# Patient Record
Sex: FEMALE | Race: WHITE | HISPANIC OR LATINO | Employment: PART TIME | URBAN - METROPOLITAN AREA
[De-identification: names, ages, dates, MRNs, and addresses within clinical notes are randomized per-mention and may not be internally consistent; named-entity substitution may affect disease eponyms.]

---

## 2018-03-30 ENCOUNTER — HOSPITAL ENCOUNTER (EMERGENCY)
Facility: MEDICAL CENTER | Age: 23
End: 2018-03-31
Attending: EMERGENCY MEDICINE
Payer: MEDICAID

## 2018-03-30 DIAGNOSIS — L70.9 ACNE, UNSPECIFIED ACNE TYPE: ICD-10-CM

## 2018-03-30 PROCEDURE — 99283 EMERGENCY DEPT VISIT LOW MDM: CPT

## 2018-03-30 ASSESSMENT — PAIN SCALES - GENERAL: PAINLEVEL_OUTOF10: 0

## 2018-03-31 VITALS
HEART RATE: 81 BPM | RESPIRATION RATE: 18 BRPM | HEIGHT: 64 IN | OXYGEN SATURATION: 94 % | SYSTOLIC BLOOD PRESSURE: 115 MMHG | WEIGHT: 141.54 LBS | TEMPERATURE: 97.5 F | BODY MASS INDEX: 24.16 KG/M2 | DIASTOLIC BLOOD PRESSURE: 77 MMHG

## 2018-03-31 RX ORDER — CEPHALEXIN 500 MG/1
500 CAPSULE ORAL 4 TIMES DAILY
Qty: 20 CAP | Refills: 0 | Status: SHIPPED | OUTPATIENT
Start: 2018-03-31 | End: 2018-04-05

## 2018-03-31 ASSESSMENT — LIFESTYLE VARIABLES: DO YOU DRINK ALCOHOL: NO

## 2018-03-31 NOTE — ED TRIAGE NOTES
".  Chief Complaint   Patient presents with   • Wound Infection     *2 days. Left side of lip/cheek area. Draining purulent drainage.     Ambulatory to triage for above.  Denies N/V/Fever.     Explained wait time and triage process to pt. Pt placed back out in lobby, told to notify ED tech or triage RN of any changes, verbalized understanding.    /64   Pulse (!) 111   Temp 37.2 °C (98.9 °F) (Temporal)   Resp 18   Ht 1.626 m (5' 4\")   Wt 64.2 kg (141 lb 8.6 oz)   SpO2 100%   BMI 24.29 kg/m²     "

## 2018-03-31 NOTE — DISCHARGE INSTRUCTIONS
Acne  Introduction  Acne is a skin problem that causes small, red bumps (pimples). Acne happens when the tiny holes in your skin (pores) get blocked. Your pores may become red, sore, and swollen. They may also become infected. Acne is a common skin problem. It is especially common in teenagers. Acne usually goes away over time.  Follow these instructions at home:  Good skin care is the most important thing you can do to treat your acne. Take care of your skin as told by your doctor. You may be told to do these things:  · Wash your skin gently at least two times each day. You should also wash your skin:  ¨ After you exercise.  ¨ Before you go to bed.  · Use mild soap.  · Use a water-based skin moisturizer after you wash your skin.  · Use a sunscreen or sunblock with SPF 30 or greater. This is very important if you are using acne medicines.  · Choose cosmetics that will not plug your oil glands (are noncomedogenic).  Medicines  · Take over-the-counter and prescription medicines only as told by your doctor.  · If you were prescribed an antibiotic medicine, apply or take it as told by your doctor. Do not stop using the antibiotic even if your acne improves.  General instructions  · Keep your hair clean and off of your face. Shampoo your hair regularly. If you have oily hair, you may need to wash it every day.  · Avoid leaning your chin or forehead on your hands.  · Avoid wearing tight headbands or hats.  · Avoid picking or squeezing your pimples. That can make your acne worse and cause scarring.  · Keep all follow-up visits as told by your doctor. This is important.  · Shave gently. Only shave when it is necessary.  · Keep a food journal. This can help you to see if any foods are linked with your acne.  Contact a doctor if:  · Your acne is not better after eight weeks.  · Your acne gets worse.  · You have a large area of skin that is red or tender.  · You think that you are having side effects from any acne  medicine.  This information is not intended to replace advice given to you by your health care provider. Make sure you discuss any questions you have with your health care provider.  Document Released: 12/06/2012 Document Revised: 05/25/2017 Document Reviewed: 02/24/2016  © 2017 [employer]

## 2018-03-31 NOTE — ED NOTES
All discharge instructions given to patient. Patient verbalized understanding and has no further questions. Rx x 1 given to patient. Patient gathered all belongings and ambulated with steady gait to ER lobby. See vital signs for discharge vitals. Patient educated on dangers of operating a vehicle after administration of narcotics and/or sedatives. Patient to follow up as instructed in discharge instructions and/or return to the Emergency Room if signs/symptoms worsen or do not improve.

## 2018-03-31 NOTE — ED PROVIDER NOTES
"ED Provider Note    Scribed for Gregg Potter M.D. by Carleen Pink. 3/31/2018, 12:36 AM.    Primary care provider: Pcp Pt States None  Means of arrival: walk in   History obtained from: Patient  History limited by: none     CHIEF COMPLAINT  Chief Complaint   Patient presents with   • Wound Infection     *2 days. Left side of lip/cheek area. Draining purulent drainage.       HPI  Emmy Henderson is a 22 y.o. female who presents to the Emergency Department for evaluation of a wound infection onset one month ago and has worsened in the past 2 days. Patient reports she is unsure of how she got the wound but it began as a red bump. She is worried she may have been \"bitten by a spider or some kind of bug\". The wound is located to the left side of her lip/cheek. She reports associated purulent drainage and pain. Additionally she states her infection is intermittent in nature. No fever, nausea, and vomiting. No other acute complaints or concerns.     REVIEW OF SYSTEMS  See HPI for further details. E    PAST MEDICAL HISTORY   has a past medical history of HSV (herpes simplex virus) anogenital infection (5/19/2014) and Substance abuse.    SURGICAL HISTORY   has a past surgical history that includes other abdominal surgery; primary c section (2009); repeat c section (6/4/2011); repeat c section (8/14/2014); and repeat c section w tubal ligation (10/20/2016).    SOCIAL HISTORY  Social History   Substance Use Topics   • Smoking status: Current Every Day Smoker     Packs/day: 0.50     Years: 1.00   • Smokeless tobacco: Current User      Comment: Quit smoking 3 months ago.   • Alcohol use No      History   Drug Use   • Types: Methamphetamines, Inhaled     Comment: THC       FAMILY HISTORY  Family History   Problem Relation Age of Onset   • Diabetes Mother      NIDDM   • Cancer Maternal Grandfather        CURRENT MEDICATIONS  Reviewed.  See Encounter Summary.     ALLERGIES  Allergies   Allergen Reactions   • Nkda [No Known " "Drug Allergy]        PHYSICAL EXAM  VITAL SIGNS: /64   Pulse (!) 111   Temp 37.2 °C (98.9 °F) (Temporal)   Resp 18   Ht 1.626 m (5' 4\")   Wt 64.2 kg (141 lb 8.6 oz)   LMP 03/31/2018 (Exact Date)   SpO2 100%   BMI 24.29 kg/m²   Constitutional: Alert in no apparent distress.  HENT: Normocephalic,  Bilateral external ears normal. Nose normal. 1.5 cm circular dermal lesion to left cheek with a central depression, nontender, no erythema, no active drainage, mildly indurated, no fluctuance.   Eyes: Pupils are equal and reactive. Conjunctiva normal, non-icteric.   Heart: Regular rate and rythm, no murmurs.    Lungs: Clear to auscultation bilaterally.  Skin: Warm, Dry. 1.5 cm circular dermal lesion to left cheek with a central depression, nontender, no erythema, no active drainage, mildly indurated, no fluctuance.   Neurologic: Alert, Grossly non-focal.   Psychiatric: Affect normal, Judgment normal, Mood normal, Appears appropriate and not intoxicated.     COURSE & MEDICAL DECISION MAKING  Pertinent Labs & Imaging studies reviewed. (See chart for details)    Differential diagnoses include but are not limited to: facial abscess vs acne.     12:36 AM - Patient seen and examined at bedside. Informed patient I do not believe she is presenting with any concerning symptoms at this time. Encouraged her to soak her wound in warm water and to not apply makeup to the area for relief. She will be discharged home with a prescription for Keflex. She is stable for discharge. Instructed the patient on return to ED precautions and she agrees to be discharged home.     Decision Making:  This is a 22 y.o. year old female who presents with facial wound. No chronically present. I believe this likely started as more of an actinic pimple rather than insect bite or sting. It is currently indurated with central depressed umbilicated center. No other lesions. However the patient continue with antibiotics and warm soaks. I given a " referral to local clinic for outpatient follow-up and care. I do not believe the lesion is amenable to incision and drainage at this time    The patient will return for new or worsening symptoms and is stable at the time of discharge.    DISPOSITION:  Patient will be discharged home in stable condition.    FOLLOW UP:  [facility], Emergency Dept  [address] [facility] [address] [phone]    If symptoms worsen    [place]  [address]  [facility] [address]    As needed. use warm compresses. start and finish antibiotics     OUTPATIENT MEDICATIONS:  New Prescriptions    CEPHALEXIN (KEFLEX) 500 MG CAP    Take 1 Cap by mouth 4 times a day for 5 days.     FINAL IMPRESSION  1. Acne, unspecified acne type        I, Carleen Pink (Scribe), am scribing for, and in the presence of, Gregg Potter M.D..    Electronically signed by: Carleen Pink (Scribe), 3/31/2018    IGregg M.D. personally performed the services described in this documentation, as scribed by Carleen Pink in my presence, and it is both accurate and complete.    The note accurately reflects work and decisions made by me.  Gregg Potter  3/31/2018  11:33 PM

## 2019-01-05 ENCOUNTER — HOSPITAL ENCOUNTER (EMERGENCY)
Facility: MEDICAL CENTER | Age: 24
End: 2019-01-05
Attending: EMERGENCY MEDICINE

## 2019-01-05 VITALS
RESPIRATION RATE: 17 BRPM | DIASTOLIC BLOOD PRESSURE: 52 MMHG | HEART RATE: 87 BPM | WEIGHT: 144.62 LBS | TEMPERATURE: 97.5 F | HEIGHT: 65 IN | SYSTOLIC BLOOD PRESSURE: 94 MMHG | BODY MASS INDEX: 24.1 KG/M2 | OXYGEN SATURATION: 100 %

## 2019-01-05 DIAGNOSIS — R10.84 GENERALIZED ABDOMINAL PAIN: ICD-10-CM

## 2019-01-05 DIAGNOSIS — R11.2 NON-INTRACTABLE VOMITING WITH NAUSEA, UNSPECIFIED VOMITING TYPE: ICD-10-CM

## 2019-01-05 DIAGNOSIS — N30.01 ACUTE CYSTITIS WITH HEMATURIA: ICD-10-CM

## 2019-01-05 DIAGNOSIS — F15.10 METHAMPHETAMINE ABUSE (HCC): ICD-10-CM

## 2019-01-05 LAB
ALBUMIN SERPL BCP-MCNC: 3.9 G/DL (ref 3.2–4.9)
ALBUMIN/GLOB SERPL: 1.6 G/DL
ALP SERPL-CCNC: 53 U/L (ref 30–99)
ALT SERPL-CCNC: 12 U/L (ref 2–50)
ANION GAP SERPL CALC-SCNC: 6 MMOL/L (ref 0–11.9)
APPEARANCE UR: CLEAR
AST SERPL-CCNC: 15 U/L (ref 12–45)
BACTERIA #/AREA URNS HPF: ABNORMAL /HPF
BASOPHILS # BLD AUTO: 0.3 % (ref 0–1.8)
BASOPHILS # BLD: 0.02 K/UL (ref 0–0.12)
BILIRUB SERPL-MCNC: 0.6 MG/DL (ref 0.1–1.5)
BILIRUB UR QL STRIP.AUTO: NEGATIVE
BUN SERPL-MCNC: 13 MG/DL (ref 8–22)
CALCIUM SERPL-MCNC: 9.3 MG/DL (ref 8.5–10.5)
CHLORIDE SERPL-SCNC: 107 MMOL/L (ref 96–112)
CO2 SERPL-SCNC: 26 MMOL/L (ref 20–33)
COLOR UR: ABNORMAL
CREAT SERPL-MCNC: 0.55 MG/DL (ref 0.5–1.4)
EOSINOPHIL # BLD AUTO: 0.07 K/UL (ref 0–0.51)
EOSINOPHIL NFR BLD: 0.9 % (ref 0–6.9)
EPI CELLS #/AREA URNS HPF: ABNORMAL /HPF
ERYTHROCYTE [DISTWIDTH] IN BLOOD BY AUTOMATED COUNT: 40.5 FL (ref 35.9–50)
GLOBULIN SER CALC-MCNC: 2.5 G/DL (ref 1.9–3.5)
GLUCOSE SERPL-MCNC: 70 MG/DL (ref 65–99)
GLUCOSE UR STRIP.AUTO-MCNC: NEGATIVE MG/DL
HCG SERPL QL: NEGATIVE
HCT VFR BLD AUTO: 39.8 % (ref 37–47)
HGB BLD-MCNC: 13.3 G/DL (ref 12–16)
HYALINE CASTS #/AREA URNS LPF: ABNORMAL /LPF
IMM GRANULOCYTES # BLD AUTO: 0.02 K/UL (ref 0–0.11)
IMM GRANULOCYTES NFR BLD AUTO: 0.3 % (ref 0–0.9)
KETONES UR STRIP.AUTO-MCNC: NEGATIVE MG/DL
LEUKOCYTE ESTERASE UR QL STRIP.AUTO: ABNORMAL
LIPASE SERPL-CCNC: 19 U/L (ref 11–82)
LYMPHOCYTES # BLD AUTO: 2.25 K/UL (ref 1–4.8)
LYMPHOCYTES NFR BLD: 29.3 % (ref 22–41)
MCH RBC QN AUTO: 29.1 PG (ref 27–33)
MCHC RBC AUTO-ENTMCNC: 33.4 G/DL (ref 33.6–35)
MCV RBC AUTO: 87.1 FL (ref 81.4–97.8)
MICRO URNS: ABNORMAL
MONOCYTES # BLD AUTO: 0.43 K/UL (ref 0–0.85)
MONOCYTES NFR BLD AUTO: 5.6 % (ref 0–13.4)
NEUTROPHILS # BLD AUTO: 4.9 K/UL (ref 2–7.15)
NEUTROPHILS NFR BLD: 63.6 % (ref 44–72)
NITRITE UR QL STRIP.AUTO: NEGATIVE
NRBC # BLD AUTO: 0 K/UL
NRBC BLD-RTO: 0 /100 WBC
PH UR STRIP.AUTO: 8 [PH]
PLATELET # BLD AUTO: 181 K/UL (ref 164–446)
PMV BLD AUTO: 11.8 FL (ref 9–12.9)
POTASSIUM SERPL-SCNC: 3.6 MMOL/L (ref 3.6–5.5)
PROT SERPL-MCNC: 6.4 G/DL (ref 6–8.2)
PROT UR QL STRIP: 30 MG/DL
RBC # BLD AUTO: 4.57 M/UL (ref 4.2–5.4)
RBC # URNS HPF: ABNORMAL /HPF
RBC UR QL AUTO: ABNORMAL
SODIUM SERPL-SCNC: 139 MMOL/L (ref 135–145)
SP GR UR STRIP.AUTO: 1.02
UROBILINOGEN UR STRIP.AUTO-MCNC: 1 MG/DL
WBC # BLD AUTO: 7.7 K/UL (ref 4.8–10.8)
WBC #/AREA URNS HPF: ABNORMAL /HPF

## 2019-01-05 PROCEDURE — 80053 COMPREHEN METABOLIC PANEL: CPT

## 2019-01-05 PROCEDURE — 81001 URINALYSIS AUTO W/SCOPE: CPT

## 2019-01-05 PROCEDURE — 96374 THER/PROPH/DIAG INJ IV PUSH: CPT

## 2019-01-05 PROCEDURE — 87186 SC STD MICRODIL/AGAR DIL: CPT

## 2019-01-05 PROCEDURE — 85025 COMPLETE CBC W/AUTO DIFF WBC: CPT

## 2019-01-05 PROCEDURE — 83690 ASSAY OF LIPASE: CPT

## 2019-01-05 PROCEDURE — 87086 URINE CULTURE/COLONY COUNT: CPT

## 2019-01-05 PROCEDURE — 700111 HCHG RX REV CODE 636 W/ 250 OVERRIDE (IP): Performed by: EMERGENCY MEDICINE

## 2019-01-05 PROCEDURE — 99285 EMERGENCY DEPT VISIT HI MDM: CPT

## 2019-01-05 PROCEDURE — 87077 CULTURE AEROBIC IDENTIFY: CPT

## 2019-01-05 PROCEDURE — 700105 HCHG RX REV CODE 258: Performed by: EMERGENCY MEDICINE

## 2019-01-05 PROCEDURE — 84703 CHORIONIC GONADOTROPIN ASSAY: CPT

## 2019-01-05 RX ORDER — LORAZEPAM 0.5 MG/1
0.5 TABLET ORAL EVERY 4 HOURS PRN
Qty: 10 TAB | Refills: 0 | Status: SHIPPED | OUTPATIENT
Start: 2019-01-05 | End: 2019-01-08

## 2019-01-05 RX ORDER — ONDANSETRON 4 MG/1
4 TABLET, ORALLY DISINTEGRATING ORAL EVERY 4 HOURS PRN
Status: DISCONTINUED | OUTPATIENT
Start: 2019-01-05 | End: 2019-01-05 | Stop reason: HOSPADM

## 2019-01-05 RX ORDER — LORAZEPAM 2 MG/ML
1 INJECTION INTRAMUSCULAR ONCE
Status: COMPLETED | OUTPATIENT
Start: 2019-01-05 | End: 2019-01-05

## 2019-01-05 RX ORDER — ONDANSETRON 4 MG/1
4 TABLET, FILM COATED ORAL ONCE
Status: DISCONTINUED | OUTPATIENT
Start: 2019-01-05 | End: 2019-01-05

## 2019-01-05 RX ORDER — SODIUM CHLORIDE 9 MG/ML
1000 INJECTION, SOLUTION INTRAVENOUS ONCE
Status: COMPLETED | OUTPATIENT
Start: 2019-01-05 | End: 2019-01-05

## 2019-01-05 RX ORDER — ONDANSETRON 4 MG/1
4 TABLET, ORALLY DISINTEGRATING ORAL EVERY 8 HOURS PRN
Qty: 10 TAB | Refills: 0 | Status: SHIPPED | OUTPATIENT
Start: 2019-01-05 | End: 2019-11-21

## 2019-01-05 RX ORDER — CEFDINIR 300 MG/1
300 CAPSULE ORAL 2 TIMES DAILY
Qty: 10 CAP | Refills: 0 | Status: SHIPPED | OUTPATIENT
Start: 2019-01-05 | End: 2019-01-10

## 2019-01-05 RX ADMIN — SODIUM CHLORIDE 1000 ML: 9 INJECTION, SOLUTION INTRAVENOUS at 11:47

## 2019-01-05 RX ADMIN — LORAZEPAM 1 MG: 2 INJECTION INTRAMUSCULAR; INTRAVENOUS at 11:45

## 2019-01-05 ASSESSMENT — PAIN SCALES - GENERAL: PAINLEVEL_OUTOF10: 8

## 2019-01-05 NOTE — ED TRIAGE NOTES
"Chief Complaint   Patient presents with   • Abdominal Pain   • Detox   • N/V     Pt has not used x 4 days. States detoxing from meth, planning to go to rehab on Monday.   Blood pressure 115/59, pulse (!) 108, temperature 36.4 °C (97.6 °F), resp. rate 16, height 1.651 m (5' 5\"), weight 65.6 kg (144 lb 10 oz), SpO2 98 %, unknown if currently breastfeeding.    Pt informed of wait times. Educated on triage process.  Asked to return to triage RN for any new or worsening of symptoms. Thanked for patience.        "

## 2019-01-05 NOTE — ED NOTES
Patient ambulatory to restroom to obtain a Urine Specimen. Pt was given instruction on proper collection- Pt verbalized understanding. Pt to return specimen cup to room when finished.

## 2019-01-05 NOTE — ED NOTES
Discharge orders received from ERP. New prescriptions received x 3. Provided education on discharge instructions and diagnosis.Pt demonstrated understanding of education. Pt verbalized understanding of need for follow up appointment.  Pt ambulatory off unit with all personal belongings.   Dc instructions also relayed to friend/family @ bedside.  Pt ambulatory to [facility] [place] steady gait.

## 2019-01-07 LAB
BACTERIA UR CULT: ABNORMAL
BACTERIA UR CULT: ABNORMAL
SIGNIFICANT IND 70042: ABNORMAL
SITE SITE: ABNORMAL
SOURCE SOURCE: ABNORMAL

## 2019-01-08 NOTE — ED NOTES
ED Positive Culture Follow-up/Notification Note:    Date: 1/8/19     Patient seen in the ED on 1/5/2019 for   1. Methamphetamine abuse (HCC) Active   2. Non-intractable vomiting with nausea, unspecified vomiting type Active   3. Generalized abdominal pain Active   4. Acute cystitis with hematuria Active      Discharge Medication List as of 1/5/2019  2:44 PM      START taking these medications    Details   ondansetron (ZOFRAN ODT) 4 MG TABLET DISPERSIBLE Take 1 Tab by mouth every 8 hours as needed., Disp-10 Tab, R-0, Print Rx Paper      LORazepam (ATIVAN) 0.5 MG Tab Take 1 Tab by mouth every four hours as needed for Anxiety for up to 3 days., Disp-10 Tab, R-0, Print Rx Paper, For 3 days      cefdinir (OMNICEF) 300 MG Cap Take 1 Cap by mouth 2 times a day for 5 days., Disp-10 Cap, R-0, Print Rx Paper             Allergies: Nkda [no known drug allergy]     Vitals:    01/05/19 1036 01/05/19 1400 01/05/19 1430 01/05/19 1454   BP:  (!) 94/52     Pulse:  98 81 87   Resp:  17  17   Temp:    36.4 °C (97.5 °F)   SpO2:  99% 100% 100%   Weight: 65.6 kg (144 lb 10 oz)      Height:           Final cultures:   Results     Procedure Component Value Units Date/Time    URINE CULTURE(NEW) [[id number]]  (Abnormal)  (Susceptibility) Collected:  01/05/19 1415    Order Status:  Completed Specimen:  Urine Updated:  01/07/19 1314     Significant Indicator POS (POS)     Source UR     Site -     Urine Culture - (A)      Staphylococcus epidermidis  >100,000 cfu/mL   (A)    Culture & Susceptibility     STAPHYLOCOCCUS EPIDERMIDIS     Antibiotic Sensitivity Microscan Unit Status    Daptomycin Sensitive <=0.5 mcg/mL Final    Method: SENSITIVITY, MARCELL    Nitrofurantoin Sensitive <=32 mcg/mL Final    Method: SENSITIVITY, MARCELL    Penicillin Resistant 0.25 mcg/mL Final    Method: SENSITIVITY, MARCELL    Tetracycline Sensitive <=4 mcg/mL Final    Method: SENSITIVITY, MARCELL    Trimeth/Sulfa Sensitive <=0.5/9.5 mcg/mL Final    Method: SENSITIVITY, MARCELL                        URINALYSIS CULTURE, IF INDICATED [[id number]]  (Abnormal) Collected:  01/05/19 1415    Order Status:  Completed Specimen:  Urine Updated:  01/05/19 1434     Micro Urine Req Microscopic     Color Kathy     Character Clear     Specific Gravity 1.024     Ph 8.0     Glucose Negative mg/dL      Ketones Negative mg/dL      Protein 30 (A) mg/dL      Bilirubin Negative     Urobilinogen, Urine 1.0     Nitrite Negative     Leukocyte Esterase Small (A)     Occult Blood Large (A)          Plan:   Appropriate antibiotic therapy prescribed. No changes required based upon culture result.       Barbara Watson

## 2019-11-21 ENCOUNTER — APPOINTMENT (OUTPATIENT)
Dept: RADIOLOGY | Facility: MEDICAL CENTER | Age: 24
End: 2019-11-21
Attending: EMERGENCY MEDICINE

## 2019-11-21 ENCOUNTER — HOSPITAL ENCOUNTER (EMERGENCY)
Facility: MEDICAL CENTER | Age: 24
End: 2019-11-21
Attending: EMERGENCY MEDICINE

## 2019-11-21 VITALS
OXYGEN SATURATION: 99 % | SYSTOLIC BLOOD PRESSURE: 107 MMHG | HEIGHT: 65 IN | TEMPERATURE: 98 F | DIASTOLIC BLOOD PRESSURE: 76 MMHG | WEIGHT: 130 LBS | BODY MASS INDEX: 21.66 KG/M2 | HEART RATE: 88 BPM | RESPIRATION RATE: 16 BRPM

## 2019-11-21 DIAGNOSIS — S06.0X0A CONCUSSION WITHOUT LOSS OF CONSCIOUSNESS, INITIAL ENCOUNTER: ICD-10-CM

## 2019-11-21 DIAGNOSIS — R11.0 NAUSEA: ICD-10-CM

## 2019-11-21 DIAGNOSIS — S09.90XA CLOSED HEAD INJURY, INITIAL ENCOUNTER: ICD-10-CM

## 2019-11-21 PROCEDURE — 700111 HCHG RX REV CODE 636 W/ 250 OVERRIDE (IP): Performed by: EMERGENCY MEDICINE

## 2019-11-21 PROCEDURE — 99284 EMERGENCY DEPT VISIT MOD MDM: CPT

## 2019-11-21 PROCEDURE — 70450 CT HEAD/BRAIN W/O DYE: CPT

## 2019-11-21 PROCEDURE — A9270 NON-COVERED ITEM OR SERVICE: HCPCS | Performed by: EMERGENCY MEDICINE

## 2019-11-21 PROCEDURE — 700102 HCHG RX REV CODE 250 W/ 637 OVERRIDE(OP): Performed by: EMERGENCY MEDICINE

## 2019-11-21 RX ORDER — ONDANSETRON 4 MG/1
4 TABLET, ORALLY DISINTEGRATING ORAL EVERY 8 HOURS PRN
Qty: 10 TAB | Refills: 0 | Status: SHIPPED | OUTPATIENT
Start: 2019-11-21 | End: 2023-11-12

## 2019-11-21 RX ORDER — ONDANSETRON 4 MG/1
4 TABLET, ORALLY DISINTEGRATING ORAL ONCE
Status: COMPLETED | OUTPATIENT
Start: 2019-11-21 | End: 2019-11-21

## 2019-11-21 RX ORDER — IBUPROFEN 600 MG/1
600 TABLET ORAL EVERY 6 HOURS PRN
Qty: 20 TAB | Refills: 0 | Status: SHIPPED | OUTPATIENT
Start: 2019-11-21 | End: 2023-11-12

## 2019-11-21 RX ORDER — IBUPROFEN 600 MG/1
600 TABLET ORAL ONCE
Status: COMPLETED | OUTPATIENT
Start: 2019-11-21 | End: 2019-11-21

## 2019-11-21 RX ADMIN — ONDANSETRON 4 MG: 4 TABLET, ORALLY DISINTEGRATING ORAL at 10:37

## 2019-11-21 RX ADMIN — IBUPROFEN 600 MG: 600 TABLET ORAL at 10:37

## 2019-11-21 NOTE — ED TRIAGE NOTES
"Emmy Henderson 24 y.o. female to triage via w/c with family for     Chief Complaint   Patient presents with   • Cold Symptoms     x 3 days   • T-5000 FALL     pt had mechanical GLF 2 days ago after tripping on a rock.  Pt reports she hit the back her head on the concrete.  Pt denies LOC.  Pt reports \"burning\" to bilateral temples and forehead since falling.   • Chills   • Nausea     /76   Pulse (!) 115   Temp 37.1 °C (98.8 °F) (Oral)   Resp 16   Ht 1.651 m (5' 5\")   Wt 59 kg (130 lb)   LMP 11/01/2019 (Exact Date)   SpO2 99%   BMI 21.63 kg/m²   Pt placed in lobby to await bed assignment.  Advised to return to the triage desk for any changes/concerns.  "

## 2019-11-21 NOTE — ED NOTES
Agree with triage note. Pt c/o ST, congestion, cough x 3 days. States fall happened two days ago, denies LOC. Denies blood thinners.    Pt a/o x4, speaking in full sentences.

## 2019-11-21 NOTE — ED PROVIDER NOTES
"ED Provider Note    Scribed for Nicolas Loo M.D. by Juana Oconnell. 11/21/2019  10:16 AM    Primary care provider: Pcp Pt States None  Means of arrival: Walk In  History obtained from: Patient  History limited by: None    CHIEF COMPLAINT  Chief Complaint   Patient presents with   • Cold Symptoms     x 3 days   • T-5000 FALL     pt had mechanical GLF 2 days ago after tripping on a rock.  Pt reports she hit the back her head on the concrete.  Pt denies LOC.  Pt reports \"burning\" to bilateral temples and forehead since falling.   • Chills   • Nausea       HPI  Emmy Henderson is a 24 y.o. female who presents to the Emergency Department for evaluation status post ground level fall two days ago. The patient states at the time she tripped on a rock while carrying groceries, and landed on her occiput on concrete. She complains of ongoing headache and nausea since the fall, but denies any vomiting or chest pain. No loss of consciousness. She denies any hematoma to her occiput. She denies pregnancy.     Patient states she developed a cold three days ago with associated symptoms of cough, chills, rhinorrhea, and sore throat.     REVIEW OF SYSTEMS  Pertinent positives include headache, nausea, cough, chills, rhinorrhea, and sore throat..   Pertinent negatives include no hematoma, loss of consciousness or chest pain.    All other systems reviewed and negative. See HPI for further details.       PAST MEDICAL HISTORY   has a past medical history of HSV (herpes simplex virus) anogenital infection (5/19/2014) and Substance abuse ([facility]).    SURGICAL HISTORY   has a past surgical history that includes other abdominal surgery; primary c section (2009); repeat c section (6/4/2011); repeat c section (8/14/2014); and repeat c section w tubal ligation (10/20/2016).    SOCIAL HISTORY  Social History     Tobacco Use   • Smoking status: Current Every Day Smoker     Packs/day: 0.50     Years: 1.00     Pack years: 0.50   • " "Smokeless tobacco: Current User   • Tobacco comment: Quit smoking 3 months ago.   Substance Use Topics   • Alcohol use: No   • Drug use: Yes     Types: Methamphetamines, Inhaled     Comment: THC      Social History     Substance and Sexual Activity   Drug Use Yes   • Types: Methamphetamines, Inhaled    Comment: THC       FAMILY HISTORY  Family History   Problem Relation Age of Onset   • Diabetes Mother         NIDDM   • Cancer Maternal Grandfather        CURRENT MEDICATIONS  Home Medications    **Home medications have not yet been reviewed for this encounter**         ALLERGIES  Allergies   Allergen Reactions   • Nkda [No Known Drug Allergy]        PHYSICAL EXAM  VITAL SIGNS: /76   Pulse (!) 115   Temp 37.1 °C (98.8 °F) (Oral)   Resp 16   Ht 1.651 m (5' 5\")   Wt 59 kg (130 lb)   LMP 11/01/2019 (Exact Date)   SpO2 99%   BMI 21.63 kg/m²     Nursing note and vitals reviewed.  Constitutional: Well-developed and well-nourished. No distress.   HENT: Tenderness on occiput, but no cephalhematoma. Head is normocephalic. Oropharynx is clear and moist without exudate or erythema.   Neck: No meningismus  Eyes: Pupils are equal, round, and reactive to light. Conjunctiva are normal.   Cardiovascular: Normal rate and regular rhythm. No murmur heard. Normal radial pulses.  Pulmonary/Chest: Breath sounds normal. No wheezes or rales.   Abdominal: Soft and non-tender. No distention    Musculoskeletal: Extremities exhibit normal range of motion without edema or tenderness.   Neurological: Awake, alert and oriented to person, place, and time. No focal deficits noted.  Skin: Skin is warm and dry. No rash.   Psychiatric: Normal mood and affect. Appropriate for clinical situation.      DIAGNOSTIC STUDIES / PROCEDURES      RADIOLOGY  CT-HEAD W/O   Final Result      No acute intracranial abnormality is identified.      Paranasal sinus disease as above described.        The radiologist's interpretation of all radiological " studies have been reviewed by me.    COURSE & MEDICAL DECISION MAKING  Nursing notes, VS, PMSFHx reviewed in chart.       10:16 AM - Patient seen and examined at bedside. The patient presents with headache and nausea after a ground level fall two days ago. Head-CT ordered rule out fracture or intercranial hemorrhage. She additionally presents with URI symptoms that seem to be of viral etiology. Discussed plan of care with patient and she is understanding and agreeable. Patient will be treated with Zofran 4 mg and Ibuprofen 600 mg.   Ordered CT head to evaluate her symptoms. The differential diagnoses include but are not limited to: Fracture, intercranial hemorrhage, Viral URI.     12:10 PM Patient was reevaluated at bedside. Discussed radiology results with the patient and informed them that results were reassuring with no evidence of intracranial abnormalities. The patient was informed she will be prescribed Motrin and Zofran for her symptoms. The patient will return for new or worsening symptoms and is stable at the time of discharge.      DISPOSITION:  Patient will be discharged home in stable condition.    FOLLOW UP:  [facility], Emergency Dept  [address] [id number] [phone]  Schedule an appointment as soon as possible for a visit       [address] [address]  [phone]  In 2 days        OUTPATIENT MEDICATIONS:  Discharge Medication List as of 11/21/2019 12:15 PM           FINAL IMPRESSION  1. Nausea    2. Closed head injury, initial encounter    3. Concussion without loss of consciousness, initial encounter          Juana YATES (Maninder), am scribing for, and in the presence of, Nicolas Loo M.D..    Electronically signed by: Juana Oconnell (Maninder), 11/21/2019    Nicolas YATES M.D. personally performed the services described in this documentation, as scribed by Juana Oconnell in my presence, and it is both  accurate and complete. C    The note accurately reflects work and decisions made by me.  Nicolas Loo  11/21/2019  4:37 PM

## 2019-11-21 NOTE — ED NOTES
MD at bedside prior to d/c. Pt given d/c instruction and verbalized understanding. 2 rx's sent to pharmacy. Pt taken to lobby via wheelchair. Pt took all belongings from room.

## 2019-11-23 ENCOUNTER — APPOINTMENT (OUTPATIENT)
Dept: RADIOLOGY | Facility: MEDICAL CENTER | Age: 24
End: 2019-11-23
Attending: EMERGENCY MEDICINE

## 2019-11-23 ENCOUNTER — HOSPITAL ENCOUNTER (EMERGENCY)
Facility: MEDICAL CENTER | Age: 24
End: 2019-11-23
Attending: EMERGENCY MEDICINE

## 2019-11-23 VITALS
SYSTOLIC BLOOD PRESSURE: 118 MMHG | WEIGHT: 133.38 LBS | RESPIRATION RATE: 18 BRPM | DIASTOLIC BLOOD PRESSURE: 62 MMHG | BODY MASS INDEX: 22.22 KG/M2 | OXYGEN SATURATION: 99 % | TEMPERATURE: 99.5 F | HEIGHT: 65 IN | HEART RATE: 75 BPM

## 2019-11-23 DIAGNOSIS — M54.50 ACUTE BILATERAL LOW BACK PAIN WITHOUT SCIATICA: ICD-10-CM

## 2019-11-23 DIAGNOSIS — G44.319 ACUTE POST-TRAUMATIC HEADACHE, NOT INTRACTABLE: ICD-10-CM

## 2019-11-23 LAB
ALBUMIN SERPL BCP-MCNC: 4.2 G/DL (ref 3.2–4.9)
ALBUMIN/GLOB SERPL: 1.3 G/DL
ALP SERPL-CCNC: 59 U/L (ref 30–99)
ALT SERPL-CCNC: 17 U/L (ref 2–50)
ANION GAP SERPL CALC-SCNC: 7 MMOL/L (ref 0–11.9)
APPEARANCE UR: ABNORMAL
AST SERPL-CCNC: 18 U/L (ref 12–45)
BACTERIA #/AREA URNS HPF: ABNORMAL /HPF
BASOPHILS # BLD AUTO: 0.2 % (ref 0–1.8)
BASOPHILS # BLD: 0.01 K/UL (ref 0–0.12)
BILIRUB SERPL-MCNC: 0.4 MG/DL (ref 0.1–1.5)
BILIRUB UR QL STRIP.AUTO: NEGATIVE
BUN SERPL-MCNC: 11 MG/DL (ref 8–22)
CALCIUM SERPL-MCNC: 8.8 MG/DL (ref 8.5–10.5)
CHLORIDE SERPL-SCNC: 102 MMOL/L (ref 96–112)
CO2 SERPL-SCNC: 29 MMOL/L (ref 20–33)
COLOR UR: YELLOW
CREAT SERPL-MCNC: 0.72 MG/DL (ref 0.5–1.4)
CRP SERPL HS-MCNC: 1.1 MG/DL (ref 0–0.75)
EOSINOPHIL # BLD AUTO: 0.01 K/UL (ref 0–0.51)
EOSINOPHIL NFR BLD: 0.2 % (ref 0–6.9)
EPI CELLS #/AREA URNS HPF: ABNORMAL /HPF
ERYTHROCYTE [DISTWIDTH] IN BLOOD BY AUTOMATED COUNT: 39.9 FL (ref 35.9–50)
ERYTHROCYTE [SEDIMENTATION RATE] IN BLOOD BY WESTERGREN METHOD: 25 MM/HOUR (ref 0–20)
GLOBULIN SER CALC-MCNC: 3.3 G/DL (ref 1.9–3.5)
GLUCOSE SERPL-MCNC: 92 MG/DL (ref 65–99)
GLUCOSE UR STRIP.AUTO-MCNC: NEGATIVE MG/DL
HCG SERPL QL: NEGATIVE
HCT VFR BLD AUTO: 45.1 % (ref 37–47)
HGB BLD-MCNC: 14.7 G/DL (ref 12–16)
HYALINE CASTS #/AREA URNS LPF: ABNORMAL /LPF
IMM GRANULOCYTES # BLD AUTO: 0.02 K/UL (ref 0–0.11)
IMM GRANULOCYTES NFR BLD AUTO: 0.4 % (ref 0–0.9)
KETONES UR STRIP.AUTO-MCNC: NEGATIVE MG/DL
LEUKOCYTE ESTERASE UR QL STRIP.AUTO: NEGATIVE
LYMPHOCYTES # BLD AUTO: 1.45 K/UL (ref 1–4.8)
LYMPHOCYTES NFR BLD: 25.9 % (ref 22–41)
MCH RBC QN AUTO: 28.8 PG (ref 27–33)
MCHC RBC AUTO-ENTMCNC: 32.6 G/DL (ref 33.6–35)
MCV RBC AUTO: 88.3 FL (ref 81.4–97.8)
MICRO URNS: ABNORMAL
MONOCYTES # BLD AUTO: 0.28 K/UL (ref 0–0.85)
MONOCYTES NFR BLD AUTO: 5 % (ref 0–13.4)
NEUTROPHILS # BLD AUTO: 3.82 K/UL (ref 2–7.15)
NEUTROPHILS NFR BLD: 68.3 % (ref 44–72)
NITRITE UR QL STRIP.AUTO: NEGATIVE
NRBC # BLD AUTO: 0 K/UL
NRBC BLD-RTO: 0 /100 WBC
PH UR STRIP.AUTO: 7.5 [PH] (ref 5–8)
PLATELET # BLD AUTO: 144 K/UL (ref 164–446)
PMV BLD AUTO: 11.6 FL (ref 9–12.9)
POTASSIUM SERPL-SCNC: 4 MMOL/L (ref 3.6–5.5)
PROT SERPL-MCNC: 7.5 G/DL (ref 6–8.2)
PROT UR QL STRIP: NEGATIVE MG/DL
RBC # BLD AUTO: 5.11 M/UL (ref 4.2–5.4)
RBC # URNS HPF: ABNORMAL /HPF
RBC UR QL AUTO: NEGATIVE
SODIUM SERPL-SCNC: 138 MMOL/L (ref 135–145)
SP GR UR STRIP.AUTO: 1.02
UROBILINOGEN UR STRIP.AUTO-MCNC: 1 MG/DL
WBC # BLD AUTO: 5.6 K/UL (ref 4.8–10.8)
WBC #/AREA URNS HPF: ABNORMAL /HPF

## 2019-11-23 PROCEDURE — 81001 URINALYSIS AUTO W/SCOPE: CPT

## 2019-11-23 PROCEDURE — 700111 HCHG RX REV CODE 636 W/ 250 OVERRIDE (IP): Performed by: EMERGENCY MEDICINE

## 2019-11-23 PROCEDURE — 96375 TX/PRO/DX INJ NEW DRUG ADDON: CPT

## 2019-11-23 PROCEDURE — 80053 COMPREHEN METABOLIC PANEL: CPT

## 2019-11-23 PROCEDURE — 72100 X-RAY EXAM L-S SPINE 2/3 VWS: CPT

## 2019-11-23 PROCEDURE — 99284 EMERGENCY DEPT VISIT MOD MDM: CPT

## 2019-11-23 PROCEDURE — 85025 COMPLETE CBC W/AUTO DIFF WBC: CPT

## 2019-11-23 PROCEDURE — 96374 THER/PROPH/DIAG INJ IV PUSH: CPT

## 2019-11-23 PROCEDURE — 84703 CHORIONIC GONADOTROPIN ASSAY: CPT

## 2019-11-23 PROCEDURE — 85652 RBC SED RATE AUTOMATED: CPT

## 2019-11-23 PROCEDURE — 700105 HCHG RX REV CODE 258: Performed by: EMERGENCY MEDICINE

## 2019-11-23 PROCEDURE — 86140 C-REACTIVE PROTEIN: CPT

## 2019-11-23 RX ORDER — SODIUM CHLORIDE 9 MG/ML
1000 INJECTION, SOLUTION INTRAVENOUS ONCE
Status: COMPLETED | OUTPATIENT
Start: 2019-11-23 | End: 2019-11-23

## 2019-11-23 RX ORDER — PROCHLORPERAZINE EDISYLATE 5 MG/ML
10 INJECTION INTRAMUSCULAR; INTRAVENOUS ONCE
Status: COMPLETED | OUTPATIENT
Start: 2019-11-23 | End: 2019-11-23

## 2019-11-23 RX ORDER — DIPHENHYDRAMINE HYDROCHLORIDE 50 MG/ML
25 INJECTION INTRAMUSCULAR; INTRAVENOUS ONCE
Status: COMPLETED | OUTPATIENT
Start: 2019-11-23 | End: 2019-11-23

## 2019-11-23 RX ADMIN — SODIUM CHLORIDE 1000 ML: 9 INJECTION, SOLUTION INTRAVENOUS at 16:54

## 2019-11-23 RX ADMIN — DIPHENHYDRAMINE HYDROCHLORIDE 25 MG: 50 INJECTION INTRAMUSCULAR; INTRAVENOUS at 16:54

## 2019-11-23 RX ADMIN — PROCHLORPERAZINE EDISYLATE 10 MG: 5 INJECTION INTRAMUSCULAR; INTRAVENOUS at 16:54

## 2019-11-23 NOTE — ED TRIAGE NOTES
Chief Complaint   Patient presents with   • Extremity Weakness     Bilateral leg weakness. seen in ER yesterday after fall down stairs   • Flu Like Symptoms     fever, headache, no appetite, cough, chills
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
## 2019-11-24 NOTE — ED NOTES
Patient given another Urine collection cup and cleansing cloth packet. Patient given verbal instruction of proper collection of clean catch urine specimen. Patient instructed to return collection cup to room or appropriate staff when finished.

## 2019-11-24 NOTE — ED NOTES
Patient understands discharge instructions. Given all DC education,, f/u appointments. Pt verbalized understanding.  In no distress. IV and telemetry dc'd. Has all belongings.  Ambulating well with steady gait. Will return for worsening symptoms.

## 2020-10-05 ENCOUNTER — APPOINTMENT (OUTPATIENT)
Dept: RADIOLOGY | Facility: MEDICAL CENTER | Age: 25
End: 2020-10-05
Attending: EMERGENCY MEDICINE

## 2020-10-05 ENCOUNTER — HOSPITAL ENCOUNTER (EMERGENCY)
Facility: MEDICAL CENTER | Age: 25
End: 2020-10-05
Attending: EMERGENCY MEDICINE | Admitting: EMERGENCY MEDICINE

## 2020-10-05 VITALS
WEIGHT: 130 LBS | HEART RATE: 107 BPM | BODY MASS INDEX: 21.66 KG/M2 | RESPIRATION RATE: 18 BRPM | OXYGEN SATURATION: 99 % | TEMPERATURE: 97.9 F | SYSTOLIC BLOOD PRESSURE: 129 MMHG | DIASTOLIC BLOOD PRESSURE: 78 MMHG | HEIGHT: 65 IN

## 2020-10-05 DIAGNOSIS — M54.2 NECK PAIN: ICD-10-CM
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
## 2020-10-05 DIAGNOSIS — S09.90XA CLOSED HEAD INJURY, INITIAL ENCOUNTER: ICD-10-CM

## 2020-10-05 PROCEDURE — 70450 CT HEAD/BRAIN W/O DYE: CPT

## 2020-10-05 PROCEDURE — 99284 EMERGENCY DEPT VISIT MOD MDM: CPT

## 2020-10-05 PROCEDURE — 72125 CT NECK SPINE W/O DYE: CPT

## 2020-10-05 PROCEDURE — 700102 HCHG RX REV CODE 250 W/ 637 OVERRIDE(OP): Performed by: EMERGENCY MEDICINE

## 2020-10-05 PROCEDURE — A9270 NON-COVERED ITEM OR SERVICE: HCPCS | Performed by: EMERGENCY MEDICINE

## 2020-10-05 RX ORDER — ACETAMINOPHEN 325 MG/1
650 TABLET ORAL ONCE
Status: COMPLETED | OUTPATIENT
Start: 2020-10-05 | End: 2020-10-05

## 2020-10-05 RX ORDER — IBUPROFEN 600 MG/1
600 TABLET ORAL ONCE
Status: COMPLETED | OUTPATIENT
Start: 2020-10-05 | End: 2020-10-05

## 2020-10-05 RX ADMIN — ACETAMINOPHEN 650 MG: 325 TABLET, FILM COATED ORAL at 20:57

## 2020-10-05 RX ADMIN — IBUPROFEN 600 MG: 600 TABLET, FILM COATED ORAL at 20:58

## 2020-10-05 ASSESSMENT — FIBROSIS 4 INDEX
FIB4 SCORE: .8185577344976237856
FIB4 SCORE: .8185577344976237856
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
## 2020-10-06 NOTE — ED NOTES
Discharge instructions given to pt. Prescriptions unchanged. Pt educated, verbalizes understanding. All belongings accounted for. Pt ambulated out of ED with steady gait to go home by self. PIV removed and dressing applied.
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
## 2023-09-11 NOTE — ED PROVIDER NOTES
ED Provider Note    Scribed for Jorge Pablo D.O. by Rhoda Pardo. 1/5/2019  11:17 AM    Primary care provider: Pcp Pt States None  Means of arrival: Walk-in  History obtained from: Patient  History limited by: None    CHIEF COMPLAINT  Chief Complaint   Patient presents with   • Abdominal Pain   • Detox   • N/V       HPI  Emmy Henderson is a 23 y.o. female who presents to the Emergency Department currently detoxing x4 days from methamphetamines after smoking meth for 1 year. She states that she is currently experiencing dull, lower abdominal pain that does not radiate, with associated nausea and vomiting that is worsened with movement. She states that she feels she has a fever in addition to mild shortness of breath and denies any chest pain, cough, or paraesthesias. She states that she will be going to [facility] as a detox program in 2 days. Experiencing LMP currently, with no history of abdominal surgery. She is also complaining of intermittent painful urination. Patient denies any alcohol consumption. Last BM unknown.    REVIEW OF SYSTEMS  Pertinent positives include fever, diffuse abdominal pain, nausea, vomiting, shortness of breath, painful urination. Pertinent negatives include no chest pain, cough, paraesthesias.  All other systems reviewed and negative.    PAST MEDICAL HISTORY  Past Medical History:   Diagnosis Date   • HSV (herpes simplex virus) anogenital infection 5/19/2014   • Substance abuse (HCC)     last used meth 2014       SURGICAL HISTORY  Past Surgical History:   Procedure Laterality Date   • REPEAT C SECTION W TUBAL LIGATION  10/20/2016    Procedure: REPEAT C SECTION WITH TUBAL LIGATION;  Surgeon: Marylin Crowley M.D.;  Location: LABOR AND DELIVERY;  Service:    • REPEAT C SECTION  8/14/2014    Performed by Marylin Hawkins M.D. at LABOR AND DELIVERY   • REPEAT C SECTION  6/4/2011    Performed by FLORENCIO CABALLERO at LABOR AND DELIVERY   • PRIMARY C SECTION  It has been sent   " 2009   • OTHER ABDOMINAL SURGERY          SOCIAL HISTORY  Social History   Substance Use Topics   • Smoking status: Current Every Day Smoker     Packs/day: 0.50     Years: 1.00   • Smokeless tobacco: Current User      Comment: Quit smoking 3 months ago.   • Alcohol use No      History   Drug Use   • Types: Methamphetamines, Inhaled     Comment: THC       FAMILY HISTORY  Family History   Problem Relation Age of Onset   • Diabetes Mother         NIDDM   • Cancer Maternal Grandfather        CURRENT MEDICATIONS  Home Medications     Reviewed by Jessica Menjivar R.N. (Registered Nurse) on 01/05/19 at [email or web address]  Med List Status: Partial   Medication Last Dose Status   diphenhydrAMINE (BENADRYL) 25 MG capsule  Active   docusate sodium 100 MG Cap  Active   ferrous sulfate 325 (65 FE) MG tablet  Active   ibuprofen (MOTRIN) 600 MG Tab  Active   oxycodone-acetaminophen (PERCOCET) 5-325 MG Tab  Active   Prenatal MV-Min-Fe Fum-FA-DHA (PRENATAL 1 PO)  Active                ALLERGIES  Allergies   Allergen Reactions   • Nkda [No Known Drug Allergy]        PHYSICAL EXAM  VITAL SIGNS: /59   Pulse (!) 108   Temp 36.4 °C (97.6 °F)   Resp 16   Ht 1.651 m (5' 5\")   Wt 65.6 kg (144 lb 10 oz)   SpO2 98%   BMI 24.07 kg/m²     Nursing notes and vitals reviewed.  Constitutional: Well developed, Well nourished, mild distress, Non-toxic appearance.   Eyes: PERRLA, EOMI, Conjunctiva normal, No discharge.   HENT:  Moist mucous membranes. Healed laceration through right eyelid and cheek  Cardiovascular: Slightly tachycardic, Normal rhythm, No murmurs, No rubs, No gallops.   Thorax & Lungs: No respiratory distress, No rales, No rhonchi, No wheezing, No chest tenderness.   Abdomen: Bowel sounds normal, Soft, No tenderness, No guarding, No rebound, No masses, No pulsatile masses. Diffuse abdominal tenderness, no focality, negative Owens's sign  Skin: Warm, Dry, No erythema, No rash.   Musculoskeletal: Intact distal pulses, No edema, No " cyanosis, No clubbing. Good range of motion in all major joints. No tenderness to palpation or major deformities noted, no CVA tenderness, no midline back tenderness.   Neurologic: Alert & oriented x 3, Normal motor function, Normal sensory function, No focal deficits noted.  Psychiatric: Affect normal for clinical presentation.    DIAGNOSTIC STUDIES/PROCEDURES    LABS  Results for orders placed or performed during the hospital encounter of 01/05/19   CBC WITH DIFFERENTIAL   Result Value Ref Range    WBC 7.7 4.8 - 10.8 K/uL    RBC 4.57 4.20 - 5.40 M/uL    Hemoglobin 13.3 12.0 - 16.0 g/dL    Hematocrit 39.8 37.0 - 47.0 %    MCV 87.1 81.4 - 97.8 fL    MCH 29.1 27.0 - 33.0 pg    MCHC 33.4 (L) 33.6 - 35.0 g/dL    RDW 40.5 35.9 - 50.0 fL    Platelet Count 181 164 - 446 K/uL    MPV 11.8 9.0 - 12.9 fL    Neutrophils-Polys 63.60 44.00 - 72.00 %    Lymphocytes 29.30 22.00 - 41.00 %    Monocytes 5.60 0.00 - 13.40 %    Eosinophils 0.90 0.00 - 6.90 %    Basophils 0.30 0.00 - 1.80 %    Immature Granulocytes 0.30 0.00 - 0.90 %    Nucleated RBC 0.00 /100 WBC    Neutrophils (Absolute) 4.90 2.00 - 7.15 K/uL    Lymphs (Absolute) 2.25 1.00 - 4.80 K/uL    Monos (Absolute) 0.43 0.00 - 0.85 K/uL    Eos (Absolute) 0.07 0.00 - 0.51 K/uL    Baso (Absolute) 0.02 0.00 - 0.12 K/uL    Immature Granulocytes (abs) 0.02 0.00 - 0.11 K/uL    NRBC (Absolute) 0.00 K/uL   COMP METABOLIC PANEL   Result Value Ref Range    Sodium 139 135 - 145 mmol/L    Potassium 3.6 3.6 - 5.5 mmol/L    Chloride 107 96 - 112 mmol/L    Co2 26 20 - 33 mmol/L    Anion Gap 6.0 0.0 - 11.9    Glucose 70 65 - 99 mg/dL    Bun 13 8 - 22 mg/dL    Creatinine 0.55 0.50 - 1.40 mg/dL    Calcium 9.3 8.5 - 10.5 mg/dL    AST(SGOT) 15 12 - 45 U/L    ALT(SGPT) 12 2 - 50 U/L    Alkaline Phosphatase 53 30 - 99 U/L    Total Bilirubin 0.6 0.1 - 1.5 mg/dL    Albumin 3.9 3.2 - 4.9 g/dL    Total Protein 6.4 6.0 - 8.2 g/dL    Globulin 2.5 1.9 - 3.5 g/dL    A-G Ratio 1.6 g/dL   LIPASE   Result Value  Ref Range    Lipase 19 11 - 82 U/L   URINALYSIS CULTURE, IF INDICATED   Result Value Ref Range    Micro Urine Req Microscopic     Color Kathy     Character Clear     Specific Gravity 1.024 <1.035    Ph 8.0 5.0 - 8.0    Glucose Negative Negative mg/dL    Ketones Negative Negative mg/dL    Protein 30 (A) Negative mg/dL    Bilirubin Negative Negative    Urobilinogen, Urine 1.0 Negative    Nitrite Negative Negative    Leukocyte Esterase Small (A) Negative    Occult Blood Large (A) Negative   HCG QUAL SERUM   Result Value Ref Range    Beta-Hcg Qualitative Serum Negative Negative   ESTIMATED GFR   Result Value Ref Range    GFR If African American >60 >60 mL/min/1.73 m 2    GFR If Non African American >60 >60 mL/min/1.73 m 2   URINE MICROSCOPIC (W/UA)   Result Value Ref Range    WBC 10-20 (A) /hpf    RBC  (A) /hpf    Bacteria Few (A) None /hpf    Epithelial Cells Few /hpf    Hyaline Cast 6-10 (A) /lpf       All labs reviewed by me.  COURSE & MEDICAL DECISION MAKING  Pertinent Labs & Imaging studies reviewed. (See chart for details)    11:17 AM - Patient seen and examined at bedside. Patient will be treated with 1000 mL NS infusion, 1mg IV Ativan and 4mg IV Zofran. Ordered CMP, lipase, UA, HCG qual and CBC to evaluate her symptoms.  I informed the patient that I would manage her withdrawal symptoms here in the ED with Zofran and Ativan and evaluate with lab work for any acute process with the goal of discharging her home so that she can complete her rehab in 2 days. Patient understands and agrees with treatment plan.    11:28 AM - Patient will HYDRATION: Based on the patient's presentation of Tachycardia the patient was given IV fluids. IV Hydration was used because oral hydration was not adequate alone. Upon recheck following hydration, the patient was improved.    This is a charming 23 y.o. female that presents with methamphetamine withdrawal and abdominal discomfort.  She has known to leukocytosis, I do not believe  she has appendicitis, diverticulitis, cholecystitis with benign abdominal examination.  Her beta-hCG is negative excluding pregnancy.  In addition, the patient is negative lipase and pancreatitis.  She received Zofran 4 mg IV and Ativan and on reevaluation she had a nontender abdomen, she is positive p.o. the patient does have evidence of urinary tract infection he received a prescription for Omnicef.  I have given her follow-up instructions with to follow-up with a rehab facility.  In addition strict return precautions were given.      FINAL IMPRESSION  1. Methamphetamine abuse (HCC) Active   2. Non-intractable vomiting with nausea, unspecified vomiting type Active   3. Generalized abdominal pain Active   4. Acute cystitis with hematuria Active         I, Rhoda Pardo (Scribe), am scribing for, and in the presence of, Jorge Pablo D.O    Electronically signed by: Rhoda Pardo (Scribe), 1/5/2019    I, Jorge Pablo D.O. personally performed the services described in this documentation, as scribed by Rhoda Pardo in my presence, and it is both accurate and complete. C.    The note accurately reflects work and decisions made by me.  Jorge Pablo  1/5/2019  2:42 PM    I reviewed prescription monitoring program for patient's narcotic use before prescribing a scheduled drug.The patient will not drink alcohol nor drive with prescribed medications.The patient will return for new or worsening symptoms and is stable at the time of discharge.    DISPOSITION:  Patient will be discharged home in stable condition.    FOLLOW UP:  [facility], Emergency Dept  [address] [facility] [address] [phone]    If symptoms worsen    [address] [address]  [phone]  Schedule an appointment as soon as possible for a visit         OUTPATIENT MEDICATIONS:  New Prescriptions    CEFDINIR (OMNICEF) 300 MG CAP    Take 1 Cap by mouth 2 times  a day for 5 days.    LORAZEPAM (ATIVAN) 0.5 MG TAB    Take 1 Tab by mouth every four hours as needed for Anxiety for up to 3 days.    ONDANSETRON (ZOFRAN ODT) 4 MG TABLET DISPERSIBLE    Take 1 Tab by mouth every 8 hours as needed.

## 2023-11-11 ENCOUNTER — APPOINTMENT (OUTPATIENT)
Dept: RADIOLOGY | Facility: MEDICAL CENTER | Age: 28
End: 2023-11-11
Attending: STUDENT IN AN ORGANIZED HEALTH CARE EDUCATION/TRAINING PROGRAM
Payer: MEDICAID

## 2023-11-11 ENCOUNTER — HOSPITAL ENCOUNTER (OUTPATIENT)
Facility: MEDICAL CENTER | Age: 28
End: 2023-11-13
Attending: STUDENT IN AN ORGANIZED HEALTH CARE EDUCATION/TRAINING PROGRAM | Admitting: STUDENT IN AN ORGANIZED HEALTH CARE EDUCATION/TRAINING PROGRAM
Payer: MEDICAID

## 2023-11-11 DIAGNOSIS — R00.0 TACHYCARDIA: ICD-10-CM

## 2023-11-11 DIAGNOSIS — L03.116 CELLULITIS OF LEFT LOWER EXTREMITY: ICD-10-CM

## 2023-11-11 LAB
ALBUMIN SERPL BCP-MCNC: 3.8 G/DL (ref 3.2–4.9)
ALBUMIN/GLOB SERPL: 1.3 G/DL
ALP SERPL-CCNC: 72 U/L (ref 30–99)
ALT SERPL-CCNC: 16 U/L (ref 2–50)
ANION GAP SERPL CALC-SCNC: 11 MMOL/L (ref 7–16)
AST SERPL-CCNC: 17 U/L (ref 12–45)
BASOPHILS # BLD AUTO: 0.3 % (ref 0–1.8)
BASOPHILS # BLD: 0.03 K/UL (ref 0–0.12)
BILIRUB SERPL-MCNC: 0.4 MG/DL (ref 0.1–1.5)
BUN SERPL-MCNC: 16 MG/DL (ref 8–22)
CALCIUM ALBUM COR SERPL-MCNC: 9.4 MG/DL (ref 8.5–10.5)
CALCIUM SERPL-MCNC: 9.2 MG/DL (ref 8.5–10.5)
CHLORIDE SERPL-SCNC: 107 MMOL/L (ref 96–112)
CO2 SERPL-SCNC: 23 MMOL/L (ref 20–33)
CREAT SERPL-MCNC: 0.8 MG/DL (ref 0.5–1.4)
EKG IMPRESSION: NORMAL
EOSINOPHIL # BLD AUTO: 0.13 K/UL (ref 0–0.51)
EOSINOPHIL NFR BLD: 1.3 % (ref 0–6.9)
ERYTHROCYTE [DISTWIDTH] IN BLOOD BY AUTOMATED COUNT: 44.8 FL (ref 35.9–50)
GFR SERPLBLD CREATININE-BSD FMLA CKD-EPI: 101 ML/MIN/1.73 M 2
GLOBULIN SER CALC-MCNC: 3 G/DL (ref 1.9–3.5)
GLUCOSE SERPL-MCNC: 75 MG/DL (ref 65–99)
HCG SERPL QL: NEGATIVE
HCT VFR BLD AUTO: 38.1 % (ref 37–47)
HGB BLD-MCNC: 11.9 G/DL (ref 12–16)
IMM GRANULOCYTES # BLD AUTO: 0.03 K/UL (ref 0–0.11)
IMM GRANULOCYTES NFR BLD AUTO: 0.3 % (ref 0–0.9)
LACTATE SERPL-SCNC: 1.7 MMOL/L (ref 0.5–2)
LYMPHOCYTES # BLD AUTO: 3.29 K/UL (ref 1–4.8)
LYMPHOCYTES NFR BLD: 34.1 % (ref 22–41)
MCH RBC QN AUTO: 27.7 PG (ref 27–33)
MCHC RBC AUTO-ENTMCNC: 31.2 G/DL (ref 32.2–35.5)
MCV RBC AUTO: 88.6 FL (ref 81.4–97.8)
MONOCYTES # BLD AUTO: 0.69 K/UL (ref 0–0.85)
MONOCYTES NFR BLD AUTO: 7.2 % (ref 0–13.4)
NEUTROPHILS # BLD AUTO: 5.47 K/UL (ref 1.82–7.42)
NEUTROPHILS NFR BLD: 56.8 % (ref 44–72)
NRBC # BLD AUTO: 0 K/UL
NRBC BLD-RTO: 0 /100 WBC (ref 0–0.2)
PLATELET # BLD AUTO: 210 K/UL (ref 164–446)
PMV BLD AUTO: 10.8 FL (ref 9–12.9)
POTASSIUM SERPL-SCNC: 3.7 MMOL/L (ref 3.6–5.5)
PROT SERPL-MCNC: 6.8 G/DL (ref 6–8.2)
RBC # BLD AUTO: 4.3 M/UL (ref 4.2–5.4)
SODIUM SERPL-SCNC: 141 MMOL/L (ref 135–145)
WBC # BLD AUTO: 9.6 K/UL (ref 4.8–10.8)

## 2023-11-11 PROCEDURE — 36415 COLL VENOUS BLD VENIPUNCTURE: CPT

## 2023-11-11 PROCEDURE — 93005 ELECTROCARDIOGRAM TRACING: CPT | Performed by: STUDENT IN AN ORGANIZED HEALTH CARE EDUCATION/TRAINING PROGRAM

## 2023-11-11 PROCEDURE — 84703 CHORIONIC GONADOTROPIN ASSAY: CPT

## 2023-11-11 PROCEDURE — 93971 EXTREMITY STUDY: CPT | Mod: 26,LT | Performed by: INTERNAL MEDICINE

## 2023-11-11 PROCEDURE — 700102 HCHG RX REV CODE 250 W/ 637 OVERRIDE(OP): Mod: UD | Performed by: STUDENT IN AN ORGANIZED HEALTH CARE EDUCATION/TRAINING PROGRAM

## 2023-11-11 PROCEDURE — 83605 ASSAY OF LACTIC ACID: CPT

## 2023-11-11 PROCEDURE — 700111 HCHG RX REV CODE 636 W/ 250 OVERRIDE (IP): Mod: UD | Performed by: STUDENT IN AN ORGANIZED HEALTH CARE EDUCATION/TRAINING PROGRAM

## 2023-11-11 PROCEDURE — 85025 COMPLETE CBC W/AUTO DIFF WBC: CPT

## 2023-11-11 PROCEDURE — 73610 X-RAY EXAM OF ANKLE: CPT | Mod: LT

## 2023-11-11 PROCEDURE — 87040 BLOOD CULTURE FOR BACTERIA: CPT

## 2023-11-11 PROCEDURE — A9270 NON-COVERED ITEM OR SERVICE: HCPCS | Mod: UD | Performed by: STUDENT IN AN ORGANIZED HEALTH CARE EDUCATION/TRAINING PROGRAM

## 2023-11-11 PROCEDURE — 84443 ASSAY THYROID STIM HORMONE: CPT

## 2023-11-11 PROCEDURE — 93971 EXTREMITY STUDY: CPT | Mod: LT

## 2023-11-11 PROCEDURE — 80053 COMPREHEN METABOLIC PANEL: CPT

## 2023-11-11 PROCEDURE — 93005 ELECTROCARDIOGRAM TRACING: CPT

## 2023-11-11 PROCEDURE — 96365 THER/PROPH/DIAG IV INF INIT: CPT

## 2023-11-11 PROCEDURE — 700105 HCHG RX REV CODE 258: Mod: UD | Performed by: STUDENT IN AN ORGANIZED HEALTH CARE EDUCATION/TRAINING PROGRAM

## 2023-11-11 PROCEDURE — 99285 EMERGENCY DEPT VISIT HI MDM: CPT

## 2023-11-11 RX ORDER — SODIUM CHLORIDE, SODIUM LACTATE, POTASSIUM CHLORIDE, CALCIUM CHLORIDE 600; 310; 30; 20 MG/100ML; MG/100ML; MG/100ML; MG/100ML
1000 INJECTION, SOLUTION INTRAVENOUS ONCE
Status: COMPLETED | OUTPATIENT
Start: 2023-11-11 | End: 2023-11-11

## 2023-11-11 RX ORDER — CEPHALEXIN 500 MG/1
500 CAPSULE ORAL ONCE
Status: COMPLETED | OUTPATIENT
Start: 2023-11-11 | End: 2023-11-11

## 2023-11-11 RX ORDER — KETOROLAC TROMETHAMINE 30 MG/ML
15 INJECTION, SOLUTION INTRAMUSCULAR; INTRAVENOUS ONCE
Status: COMPLETED | OUTPATIENT
Start: 2023-11-11 | End: 2023-11-11

## 2023-11-11 RX ORDER — OXYCODONE HYDROCHLORIDE AND ACETAMINOPHEN 5; 325 MG/1; MG/1
1 TABLET ORAL ONCE
Status: COMPLETED | OUTPATIENT
Start: 2023-11-11 | End: 2023-11-11

## 2023-11-11 RX ORDER — CALCIUM GLUCONATE 20 MG/ML
1 INJECTION, SOLUTION INTRAVENOUS ONCE
Status: DISCONTINUED | OUTPATIENT
Start: 2023-11-11 | End: 2023-11-11

## 2023-11-11 RX ADMIN — KETOROLAC TROMETHAMINE 15 MG: 30 INJECTION, SOLUTION INTRAMUSCULAR at 22:09

## 2023-11-11 RX ADMIN — SODIUM CHLORIDE, POTASSIUM CHLORIDE, SODIUM LACTATE AND CALCIUM CHLORIDE 1000 ML: 600; 310; 30; 20 INJECTION, SOLUTION INTRAVENOUS at 22:41

## 2023-11-11 RX ADMIN — CEPHALEXIN 500 MG: 500 CAPSULE ORAL at 22:09

## 2023-11-11 RX ADMIN — OXYCODONE AND ACETAMINOPHEN 1 TABLET: 5; 325 TABLET ORAL at 22:41

## 2023-11-11 RX ADMIN — SODIUM CHLORIDE, POTASSIUM CHLORIDE, SODIUM LACTATE AND CALCIUM CHLORIDE 1000 ML: 600; 310; 30; 20 INJECTION, SOLUTION INTRAVENOUS at 20:24

## 2023-11-12 PROBLEM — R65.10 SIRS (SYSTEMIC INFLAMMATORY RESPONSE SYNDROME) (HCC): Status: ACTIVE | Noted: 2023-11-12

## 2023-11-12 PROBLEM — L03.116 CELLULITIS OF LEFT LOWER EXTREMITY: Status: ACTIVE | Noted: 2023-11-12

## 2023-11-12 LAB
AMPHET UR QL SCN: POSITIVE
BARBITURATES UR QL SCN: NEGATIVE
BENZODIAZ UR QL SCN: NEGATIVE
BZE UR QL SCN: NEGATIVE
CANNABINOIDS UR QL SCN: NEGATIVE
FENTANYL UR QL: NEGATIVE
METHADONE UR QL SCN: NEGATIVE
OPIATES UR QL SCN: NEGATIVE
OXYCODONE UR QL SCN: POSITIVE
PCP UR QL SCN: NEGATIVE
PROPOXYPH UR QL SCN: NEGATIVE
SCCMEC + MECA PNL NOSE NAA+PROBE: NEGATIVE
TSH SERPL DL<=0.005 MIU/L-ACNC: 1.21 UIU/ML (ref 0.38–5.33)

## 2023-11-12 PROCEDURE — 700105 HCHG RX REV CODE 258: Mod: UD | Performed by: STUDENT IN AN ORGANIZED HEALTH CARE EDUCATION/TRAINING PROGRAM

## 2023-11-12 PROCEDURE — 99223 1ST HOSP IP/OBS HIGH 75: CPT | Mod: AI | Performed by: STUDENT IN AN ORGANIZED HEALTH CARE EDUCATION/TRAINING PROGRAM

## 2023-11-12 PROCEDURE — 87641 MR-STAPH DNA AMP PROBE: CPT

## 2023-11-12 PROCEDURE — 700111 HCHG RX REV CODE 636 W/ 250 OVERRIDE (IP): Mod: JZ,UD | Performed by: STUDENT IN AN ORGANIZED HEALTH CARE EDUCATION/TRAINING PROGRAM

## 2023-11-12 PROCEDURE — G0378 HOSPITAL OBSERVATION PER HR: HCPCS

## 2023-11-12 PROCEDURE — 96366 THER/PROPH/DIAG IV INF ADDON: CPT

## 2023-11-12 PROCEDURE — 96367 TX/PROPH/DG ADDL SEQ IV INF: CPT

## 2023-11-12 PROCEDURE — 96365 THER/PROPH/DIAG IV INF INIT: CPT | Mod: XU

## 2023-11-12 PROCEDURE — 80307 DRUG TEST PRSMV CHEM ANLYZR: CPT

## 2023-11-12 PROCEDURE — 700102 HCHG RX REV CODE 250 W/ 637 OVERRIDE(OP): Mod: UD | Performed by: STUDENT IN AN ORGANIZED HEALTH CARE EDUCATION/TRAINING PROGRAM

## 2023-11-12 PROCEDURE — 87040 BLOOD CULTURE FOR BACTERIA: CPT

## 2023-11-12 PROCEDURE — 700111 HCHG RX REV CODE 636 W/ 250 OVERRIDE (IP): Mod: UD | Performed by: STUDENT IN AN ORGANIZED HEALTH CARE EDUCATION/TRAINING PROGRAM

## 2023-11-12 PROCEDURE — 96372 THER/PROPH/DIAG INJ SC/IM: CPT

## 2023-11-12 PROCEDURE — A9270 NON-COVERED ITEM OR SERVICE: HCPCS | Mod: UD | Performed by: STUDENT IN AN ORGANIZED HEALTH CARE EDUCATION/TRAINING PROGRAM

## 2023-11-12 RX ORDER — ENOXAPARIN SODIUM 100 MG/ML
40 INJECTION SUBCUTANEOUS DAILY
Status: DISCONTINUED | OUTPATIENT
Start: 2023-11-12 | End: 2023-11-13 | Stop reason: HOSPADM

## 2023-11-12 RX ORDER — BISACODYL 10 MG
10 SUPPOSITORY, RECTAL RECTAL
Status: DISCONTINUED | OUTPATIENT
Start: 2023-11-12 | End: 2023-11-13 | Stop reason: HOSPADM

## 2023-11-12 RX ORDER — MORPHINE SULFATE 4 MG/ML
1 INJECTION INTRAVENOUS EVERY 4 HOURS PRN
Status: DISCONTINUED | OUTPATIENT
Start: 2023-11-12 | End: 2023-11-13 | Stop reason: HOSPADM

## 2023-11-12 RX ORDER — OXYCODONE HYDROCHLORIDE 5 MG/1
5 TABLET ORAL EVERY 4 HOURS PRN
Status: DISCONTINUED | OUTPATIENT
Start: 2023-11-12 | End: 2023-11-13 | Stop reason: HOSPADM

## 2023-11-12 RX ORDER — SODIUM CHLORIDE, SODIUM LACTATE, POTASSIUM CHLORIDE, CALCIUM CHLORIDE 600; 310; 30; 20 MG/100ML; MG/100ML; MG/100ML; MG/100ML
INJECTION, SOLUTION INTRAVENOUS CONTINUOUS
Status: DISCONTINUED | OUTPATIENT
Start: 2023-11-12 | End: 2023-11-13 | Stop reason: HOSPADM

## 2023-11-12 RX ORDER — POLYETHYLENE GLYCOL 3350 17 G/17G
1 POWDER, FOR SOLUTION ORAL
Status: DISCONTINUED | OUTPATIENT
Start: 2023-11-12 | End: 2023-11-13 | Stop reason: HOSPADM

## 2023-11-12 RX ORDER — ACETAMINOPHEN 325 MG/1
650 TABLET ORAL EVERY 4 HOURS PRN
Status: DISCONTINUED | OUTPATIENT
Start: 2023-11-12 | End: 2023-11-13 | Stop reason: HOSPADM

## 2023-11-12 RX ORDER — AMOXICILLIN 250 MG
2 CAPSULE ORAL 2 TIMES DAILY
Status: DISCONTINUED | OUTPATIENT
Start: 2023-11-12 | End: 2023-11-13 | Stop reason: HOSPADM

## 2023-11-12 RX ADMIN — AMPICILLIN AND SULBACTAM 3 G: 1; 2 INJECTION, POWDER, FOR SOLUTION INTRAMUSCULAR; INTRAVENOUS at 12:49

## 2023-11-12 RX ADMIN — VANCOMYCIN HYDROCHLORIDE 1750 MG: 5 INJECTION, POWDER, LYOPHILIZED, FOR SOLUTION INTRAVENOUS at 00:35

## 2023-11-12 RX ADMIN — ENOXAPARIN SODIUM 40 MG: 100 INJECTION SUBCUTANEOUS at 18:15

## 2023-11-12 RX ADMIN — DOCUSATE SODIUM 50 MG AND SENNOSIDES 8.6 MG 2 TABLET: 8.6; 5 TABLET, FILM COATED ORAL at 18:16

## 2023-11-12 RX ADMIN — DOCUSATE SODIUM 50 MG AND SENNOSIDES 8.6 MG 2 TABLET: 8.6; 5 TABLET, FILM COATED ORAL at 05:26

## 2023-11-12 RX ADMIN — AMPICILLIN AND SULBACTAM 3 G: 1; 2 INJECTION, POWDER, FOR SOLUTION INTRAMUSCULAR; INTRAVENOUS at 05:33

## 2023-11-12 RX ADMIN — AMPICILLIN AND SULBACTAM 3 G: 1; 2 INJECTION, POWDER, FOR SOLUTION INTRAMUSCULAR; INTRAVENOUS at 18:29

## 2023-11-12 RX ADMIN — VANCOMYCIN HYDROCHLORIDE 750 MG: 5 INJECTION, POWDER, LYOPHILIZED, FOR SOLUTION INTRAVENOUS at 14:27

## 2023-11-12 ASSESSMENT — PAIN DESCRIPTION - PAIN TYPE: TYPE: ACUTE PAIN

## 2023-11-12 ASSESSMENT — ENCOUNTER SYMPTOMS
FEVER: 1
BRUISES/BLEEDS EASILY: 0
PALPITATIONS: 0
HEADACHES: 0
NAUSEA: 0
NECK PAIN: 0
DIZZINESS: 0
HEARTBURN: 0
DOUBLE VISION: 0
HEMOPTYSIS: 0
CHILLS: 1
COUGH: 0
DEPRESSION: 0
MYALGIAS: 0
BLURRED VISION: 0

## 2023-11-12 ASSESSMENT — PATIENT HEALTH QUESTIONNAIRE - PHQ9
2. FEELING DOWN, DEPRESSED, IRRITABLE, OR HOPELESS: NOT AT ALL
1. LITTLE INTEREST OR PLEASURE IN DOING THINGS: NOT AT ALL
SUM OF ALL RESPONSES TO PHQ9 QUESTIONS 1 AND 2: 0

## 2023-11-12 ASSESSMENT — COGNITIVE AND FUNCTIONAL STATUS - GENERAL
SUGGESTED CMS G CODE MODIFIER MOBILITY: CH
DAILY ACTIVITIY SCORE: 24
MOBILITY SCORE: 24
SUGGESTED CMS G CODE MODIFIER DAILY ACTIVITY: CH

## 2023-11-12 ASSESSMENT — FIBROSIS 4 INDEX: FIB4 SCORE: .6071428571428571429

## 2023-11-12 NOTE — ED PROVIDER NOTES
ED Provider Note    CHIEF COMPLAINT  Chief Complaint   Patient presents with    Leg Swelling              EXTERNAL RECORDS REVIEWED      HPI/ROS  LIMITATION TO HISTORY     OUTSIDE HISTORIAN(S):      Emmy Henderson is a 28 y.o. female who presents with left leg pain and swelling.  Patient says she has had intermittent bilateral lower extremity swelling over months.  Patient says over the last few days she has noticed pain, swelling and redness along her left calf and ankle.  Patient denies fever, chills, cough, chest pain, shortness of breath, abdominal pain, nausea, vomiting, diarrhea, vaginal bleeding, changes in urination.  Patient denies drug or alcohol use.    PAST MEDICAL HISTORY   has a past medical history of HSV (herpes simplex virus) anogenital infection (5/19/2014) and Substance abuse (HCC).    SURGICAL HISTORY   has a past surgical history that includes other abdominal surgery; primary c section (2009); repeat c section (6/4/2011); repeat c section (8/14/2014); and repeat c section w tubal ligation (10/20/2016).    FAMILY HISTORY  Family History   Problem Relation Age of Onset    Diabetes Mother         NIDDM    Cancer Maternal Grandfather        SOCIAL HISTORY  Social History     Tobacco Use    Smoking status: Every Day     Current packs/day: 0.50     Average packs/day: 0.5 packs/day for 1 year (0.5 ttl pk-yrs)     Types: Cigarettes    Smokeless tobacco: Current    Tobacco comments:     Quit smoking 3 months ago.   Substance and Sexual Activity    Alcohol use: No    Drug use: Not Currently     Types: Methamphetamines, Inhaled     Comment: THC    Sexual activity: Not Currently     Partners: Male     Comment: Unplanned pregancy       CURRENT MEDICATIONS  Home Medications    **Home medications have not yet been reviewed for this encounter**         ALLERGIES  Allergies   Allergen Reactions    Nkda [No Known Drug Allergy]        PHYSICAL EXAM  VITAL SIGNS: BP 94/51   Pulse (!) 103   Temp 37.2 °C  "(98.9 °F) (Temporal)   Resp 16   Ht 1.651 m (5' 5\")   Wt 68.3 kg (150 lb 9.2 oz)   SpO2 98%   BMI 25.06 kg/m²    Constitutional: Alert in no apparent distress.  HENT: No signs of trauma, Bilateral external ears normal, Nose normal.   Eyes: Pupils are equal and reactive, Conjunctiva normal, Non-icteric.   Neck: Normal range of motion, No tenderness, Supple, No stridor.   Lymphatic: No lymphadenopathy noted.   Cardiovascular: Tachycardic  Thorax & Lungs: Normal breath sounds, No respiratory distress, No wheezing, No chest tenderness.   Abdomen: Bowel sounds normal, Soft, No tenderness, No masses, No pulsatile masses.   Skin: Warm, Dry, No erythema, No rash.   Back: No bony tenderness, No CVA tenderness.   Extremities: Intact distal pulses, left lower extremity edema from lower calf to ankle with erythema across left medial ankle and distal calf, normal DP/PT bilaterally   musculoskeletal: Good range of motion in all major joints.  Normal range of motion of left ankle passively and actively minimal pain during range of motion testing no tenderness to palpation or major deformities noted.   Neurologic: Alert , Normal motor function, Normal sensory function, No focal deficits noted.   Psychiatric: Affect normal, Judgment normal, Mood normal.    DIAGNOSTIC STUDIES / PROCEDURES  EKG  I have independently interpreted this EKG  Interpreted by me: Sinus tachycardia, no signs of acute ischemia    LABS  Labs Reviewed   CBC WITH DIFFERENTIAL - Abnormal; Notable for the following components:       Result Value    Hemoglobin 11.9 (*)     MCHC 31.2 (*)     All other components within normal limits   HCG QUAL SERUM   COMP METABOLIC PANEL   ESTIMATED GFR   LACTIC ACID   URINE DRUG SCREEN   TSH WITH REFLEX TO FT4   BLOOD CULTURE   BLOOD CULTURE           RADIOLOGY  I have independently interpreted the diagnostic imaging associated with this visit and am waiting the final reading from the radiologist.   My preliminary " interpretation is as follows: No fracture or dislocation  Radiologist interpretation:   US-EXTREMITY VENOUS LOWER UNILAT LEFT   Final Result      DX-ANKLE 3+ VIEWS LEFT   Final Result      1.  There is mild diffuse swelling. No underlying bony abnormalities.            COURSE & MEDICAL DECISION MAKING    ED Observation Status? Yes; I am placing the patient in to an observation status due to a diagnostic uncertainty as well as therapeutic intensity. Patient placed in observation status at 2030, 11/11/2023.     Observation plan is as follows: Monitor for improvement in tachycardia, adequate analgesia    Upon Reevaluation, the patient's condition has: not improved; and will be escalated to hospitalization.    Patient discharged from ED Observation status at 1209 am (Time) 11/12/23 (Date).     INITIAL ASSESSMENT, COURSE AND PLAN  Care Narrative: Patient noted to be tachycardic otherwise nontoxic-appearing does have dry mucous membranes.  Differential includes fracture, cellulitis, septic arthritis, DVT.  Lower suspicion for septic arthritis as patient is afebrile, able to range left ankle joint without significant pain and erythema streaking up left calf more suggestive of soft tissue infection.  Low suspicion for necrotizing soft tissue infection patient is nontoxic-appearing no crepitus or bullae.  Low suspicion for compartment syndrome as compartments are soft no pain out of proportion.  Will obtain x-ray and DVT study.  Will initiate IV fluids and analgesics.    X-ray unremarkable.  DVT study negative for DVT.  After 2 L of fluid patient with persistent tachycardia to 120, blood work is unremarkable including normal range lactic acid.  Patient is adamant but no recent drug use.  Patient does not appear to have pain out of proportion.  Given patient's unexplained tachycardia and signs of cellulitis spoke with hospitalist regarding admission for IV antibiotics and awaiting blood cultures.  HYDRATION: Based on the  patient's presentation of Dehydration the patient was given IV fluids. IV Hydration was used because oral hydration was not adequate alone. Upon recheck following hydration, the patient was not improved.      ADDITIONAL PROBLEM LIST    DISPOSITION AND DISCUSSIONS  I have discussed management of the patient with the following physicians and CARLOS's: Hospitalist        Decision tools and prescription drugs considered including, but not limited to: Antibiotics for cellulitis .    FINAL DIAGNOSIS  1. Cellulitis of left lower extremity    2. Tachycardia           Electronically signed by: Klever Cancino D.O., 11/11/2023 11:47 PM

## 2023-11-12 NOTE — CARE PLAN
The patient is Stable - Low risk of patient condition declining or worsening    Shift Goals  Clinical Goals: monitor labs  Patient Goals: to go home  Family Goals: KORY    Progress made toward(s) clinical / shift goals:  Patient updated on the plan of care. Hemodynamics monitored and stable. Patient's lungs are clear. Call light within reach with patient resting comfortably.    Problem: Hemodynamics  Goal: Patient's hemodynamics, fluid balance and neurologic status will be stable or improve  Outcome: Progressing     Problem: Respiratory  Goal: Patient will achieve/maintain optimum respiratory ventilation and gas exchange  Outcome: Progressing       Patient is not progressing towards the following goals:

## 2023-11-12 NOTE — ASSESSMENT & PLAN NOTE
SIRS criteria identified on my evaluation include:  Tachycardia, with heart rate greater than 90 BPM and Tachypnea, with respirations greater than 20 per minute

## 2023-11-12 NOTE — PROGRESS NOTES
4 Eyes Skin Assessment Completed by CLAUDIA Brown and ADRIEL Santiago.    Head WDL  Ears WDL  Nose WDL  Mouth WDL  Neck WDL  Breast/Chest WDL  Shoulder Blades WDL  Spine WDL  (R) Arm/Elbow/Hand WDL  (L) Arm/Elbow/Hand WDL  Abdomen WDL  Groin WDL  Scrotum/Coccyx/Buttocks WDL  (R) Leg Redness, Blanching, and Swelling  (L) Leg Redness, Blanching, and Swelling  (R) Heel/Foot/Toe Swelling  (L) Heel/Foot/Toe Swelling          Devices In Places Tele Box      Interventions In Place Heels Loaded W/Pillows    Possible Skin Injury No    Pictures Uploaded Into Epic N/A  Wound Consult Placed N/A  RN Wound Prevention Protocol Ordered No

## 2023-11-12 NOTE — ED NOTES
Bedside report given to CLAUDIA Gordon    Oxygen:RA  Fall Risk status: bed in lowest position/locked, call light within reach  Patient Mentation:A+O x 4  Continuous cardiac monitoring: NSR  IV Vancomycin at 166.7ml/hr

## 2023-11-12 NOTE — ED TRIAGE NOTES
Pt comes in complaining of BLE edemsa. Pts left leg is red and swelling noted. Swelling noted to R leg as well. Pt stating symptoms for approx 2-3 months however the redness and pain is new.

## 2023-11-12 NOTE — H&P
Hospital Medicine History & Physical Note    Date of Service  11/12/2023    Primary Care Physician  Pcp Pt States None    Consultants  none    Code Status  Full Code    Chief Complaint  Chief Complaint   Patient presents with    Leg Swelling              History of Presenting Illness  Emmy Henderson is a 28 y.o. female with no significant medical history who presented 11/11/2023 with left lower extremity swelling, redness and tenderness over the last few days.  Patient reports increased pain when walking.  Reports subjective fevers and chills.  No other relieving or exacerbating factors are noted.    In the ER, she did meet criteria for SIRS started on IV fluids and broad-spectrum IV antibiotics for left lower extremity cellulitis.  Given swelling a DVT study was done thankfully negative for venous thrombosis. Admitted to medicine service.       I discussed the plan of care with patient.    Review of Systems  Review of Systems   Constitutional:  Positive for chills and fever.   HENT:  Negative for hearing loss and tinnitus.    Eyes:  Negative for blurred vision and double vision.   Respiratory:  Negative for cough and hemoptysis.    Cardiovascular:  Positive for leg swelling. Negative for chest pain and palpitations.   Gastrointestinal:  Negative for heartburn and nausea.   Genitourinary:  Negative for dysuria and urgency.   Musculoskeletal:  Negative for myalgias and neck pain.   Neurological:  Negative for dizziness and headaches.   Endo/Heme/Allergies:  Does not bruise/bleed easily.   Psychiatric/Behavioral:  Negative for depression and suicidal ideas.        Past Medical History   has a past medical history of HSV (herpes simplex virus) anogenital infection (5/19/2014) and Substance abuse ([facility]).    Surgical History   has a past surgical history that includes other abdominal surgery; primary c section (2009); repeat c section (6/4/2011); repeat c section (8/14/2014); and repeat c section w tubal ligation  (10/20/2016).     Family History  family history includes Cancer in her maternal grandfather; Diabetes in her mother.   Family history reviewed with patient. There is no family history that is pertinent to the chief complaint.     Social History   reports that she has been smoking cigarettes. She has a 0.5 pack-year smoking history. She uses smokeless tobacco. She reports that she does not currently use drugs after having used the following drugs: Methamphetamines and Inhaled. She reports that she does not drink alcohol.    Allergies  Allergies   Allergen Reactions    Nkda [No Known Drug Allergy]        Medications  None       Physical Exam  Temp:  [37.2 °C (98.9 °F)] 37.2 °C (98.9 °F)  Pulse:  [103-130] 116  Resp:  [15-22] 15  BP: ()/(51-74) 97/52  SpO2:  [93 %-100 %] 98 %  Blood Pressure: 97/52   Temperature: 37.2 °C (98.9 °F)   Pulse: (!) 116   Respiration: 15   Pulse Oximetry: 98 %       Physical Exam  Vitals and nursing note reviewed.   Constitutional:       Appearance: Normal appearance.   HENT:      Head: Normocephalic and atraumatic.      Right Ear: Tympanic membrane normal.      Left Ear: Tympanic membrane normal.      Nose: Nose normal.      Mouth/Throat:      Mouth: Mucous membranes are dry.      Pharynx: Oropharynx is clear.   Eyes:      Extraocular Movements: Extraocular movements intact.      Pupils: Pupils are equal, round, and reactive to light.   Cardiovascular:      Rate and Rhythm: Regular rhythm. Tachycardia present.      Pulses: Normal pulses.      Heart sounds: Normal heart sounds.   Pulmonary:      Effort: Pulmonary effort is normal.      Breath sounds: Normal breath sounds.   Abdominal:      General: Bowel sounds are normal. There is no distension.      Palpations: Abdomen is soft. There is no mass.   Musculoskeletal:         General: Tenderness present.      Cervical back: Neck supple.      Comments: Erythema, tenderness from left ankle up to shin   Edema noted    Skin:     General:  "Skin is warm.      Capillary Refill: Capillary refill takes less than 2 seconds.   Neurological:      General: No focal deficit present.      Mental Status: She is alert and oriented to person, place, and time. Mental status is at baseline.   Psychiatric:         Mood and Affect: Mood normal.         Behavior: Behavior normal.         Laboratory:  Recent Labs     11/11/23 2010   WBC 9.6   RBC 4.30   HEMOGLOBIN 11.9*   HEMATOCRIT 38.1   MCV 88.6   MCH 27.7   MCHC 31.2*   RDW 44.8   PLATELETCT 210   MPV 10.8     Recent Labs     11/11/23 2010   SODIUM 141   POTASSIUM 3.7   CHLORIDE 107   CO2 23   GLUCOSE 75   BUN 16   CREATININE 0.80   CALCIUM 9.2     Recent Labs     11/11/23 2010   ALTSGPT 16   ASTSGOT 17   ALKPHOSPHAT 72   TBILIRUBIN 0.4   GLUCOSE 75         No results for input(s): \"NTPROBNP\" in the last 72 hours.      No results for input(s): \"TROPONINT\" in the last 72 hours.    Imaging:  US-EXTREMITY VENOUS LOWER UNILAT LEFT   Final Result      DX-ANKLE 3+ VIEWS LEFT   Final Result      1.  There is mild diffuse swelling. No underlying bony abnormalities.          X-Ray:  I have personally reviewed the images and compared with prior images.    Assessment/Plan:  Justification for Admission Status  I anticipate this patient will require at least two midnights for appropriate medical management, necessitating inpatient admission because left lower extremity cellulitis meeting SIRS criteria started on IV antibiotics.  Left lower extremity sonogram negative for DVT.    Patient will need a Telemetry bed on MEDICAL service .  The need is secondary to see above.    * Cellulitis of left lower extremity  Assessment & Plan  Continue with vancomycin and Unasyn  LLE DVT study negative for clot     Follow-up cultures  Pain control  IV fluids    SIRS (systemic inflammatory response syndrome) (HCC)- (present on admission)  Assessment & Plan  SIRS criteria identified on my evaluation include:  Tachycardia, with heart rate " greater than 90 BPM and Tachypnea, with respirations greater than 20 per minute            VTE prophylaxis: SCDs/TEDs

## 2023-11-12 NOTE — PROGRESS NOTES
Report given to Stephanie TAYLOR. Pt will be transferring to [facility] in room T708-2.

## 2023-11-12 NOTE — ED NOTES
Med Rec complete per Pt at bedside.  Allergies reviewed.  Home Pharmacy:  [facility]    No oral ABX in the last 30 days.    Pt does not take any anticoagulants.

## 2023-11-12 NOTE — PROGRESS NOTES
"Pharmacy Vancomycin Kinetics Note for 11/12/2023     28 y.o. female on Vancomycin day # 1     Vancomycin Indication (AUC Dosing): Skin/skin structure infection    Provider specified end date: 11/17/23    Active Antibiotics (From admission, onward)      Ordered     Ordering Provider       Sun Nov 12, 2023  2:26 AM    11/12/23 0226  vancomycin (Vancocin) 750 mg in  mL IVPB  (vancomycin (VANCOCIN) IV (LD + Maintenance))  EVERY 12 HOURS         CRISTINA Dominguez Nov 12, 2023  1:21 AM    11/12/23 0121  ampicillin/sulbactam (Unasyn) 3 g in  mL IVPB  EVERY 6 HOURS         Tray Penaloza M.D.    11/12/23 0121  MD Alert...Vancomycin per Pharmacy  PHARMACY TO DOSE        Question:  Indication(s) for vancomycin?  Answer:  Skin and soft tissue infection    CRISTINA Dominguez Nov 12, 2023 12:04 AM    11/12/23 0004  vancomycin (Vancocin) 1,750 mg in  mL IVPB  (vancomycin (VANCOCIN) IV (LD + Maintenance))  ONCE         Klever Cancino D.O.            Dosing Weight: 68.3 kg (150 lb 9.2 oz)      Admission History: Admitted on 11/11/2023 for SIRS (systemic inflammatory response syndrome) (HCC) [R65.10]  Pertinent history: Antibiotics initiated for lower extremity cellulitis.    Allergies:     Nkda [no known drug allergy]     Pertinent cultures to date:     Results       Procedure Component Value Units Date/Time    MRSA By PCR (Amp) [[id number]]     Order Status: Sent Specimen: Respirate from Nares     BLOOD CULTURE [[id number]] Collected: 11/12/23 0029    Order Status: Sent Specimen: Blood from Peripheral Updated: 11/12/23 0103    Narrative:      Per Hospital Policy: Only change Specimen Src: to \"Line\" if  specified by physician order.  Release to patient->Immediate    BLOOD CULTURE [[id number]] Collected: 11/11/23 2010    Order Status: Sent Specimen: Blood from Peripheral Updated: 11/12/23 0048    Narrative:      Per Hospital Policy: Only change Specimen Src: to \"Line\" if  specified by " "physician order.  Release to patient->Immediate            Labs:     Estimated Creatinine Clearance: 92.5 mL/min (by C-G formula based on SCr of 0.8 mg/dL).  Recent Labs     23   WBC 9.6   NEUTSPOLYS 56.80     Recent Labs     23   BUN 16   CREATININE 0.80   ALBUMIN 3.8       Intake/Output Summary (Last 24 hours) at 2023 0226  Last data filed at 2023 2351  Gross per 24 hour   Intake 2000 ml   Output --   Net 2000 ml      BP 95/55   Pulse 79   Temp 36.5 °C (97.7 °F) (Temporal)   Resp 14   Ht 1.651 m (5' 5\")   Wt 68.3 kg (150 lb 9.2 oz)   SpO2 97%  Temp (24hrs), Av.8 °C (98.3 °F), Min:36.5 °C (97.7 °F), Max:37.2 °C (98.9 °F)      List concerns for Vancomycin clearance:     None    Pharmacokinetics:     AUC kinetics:   Ke (hr ^-1): 0.0812 hr^-1  Half life: 8.54 hr  Clearance: 3.605  Estimated TDD: 1802.5  Estimated Dose: 789  Estimated interval: 10.5    A/P:     -  Vancomycin dose: 750 mg IV q12h    -  Next vancomycin level(s):    -TBD     -  Predicted vancomycin AUC from initial AUC test calculator: 416 mg·hr/L    -  Comments: empiric abx for SSTI. Minimal concerns for accumulation, anticipate patient to adequately clear vanco. Renal indices appropriate. Peak and trough to be determined once patient closer to steady state if abx are to continue. MRSA nares ordered for de-escalation purposes. Pharmacy to adjust dosing based on clearance concerns, levels and cultures.       Solitario Benavides, PharmD    "

## 2023-11-12 NOTE — ASSESSMENT & PLAN NOTE
Continue with vancomycin and Unasyn  LLE DVT study negative for clot     Follow-up cultures  Pain control  IV fluids

## 2023-11-12 NOTE — PROGRESS NOTES
Pt transferred to [place] [id number]. Pt resting comfortably in [facility]. A/Ox4, VSS, RA  All needs met. Tele on. Call light in reach. No pain or complaints

## 2023-11-12 NOTE — ED NOTES
Bedside report received by CLAUDIA Lee     Oxygen:RA  Fall Risk status:minimal precautions according to SWAPNIL  Patient Mentation:A+O x 4  Continuous cardiac monitoring:sinus tachycardia  Visitor at bedside

## 2023-11-12 NOTE — PROGRESS NOTES
Pt left floor with tech. Pt stable. Even respirations. Equal chest rise. A/Ox4. All belongings and chart with pt.

## 2023-11-12 NOTE — PROGRESS NOTES
Patient seen and examined, admitted after midnight for left ankle cellulitis started on IV abx unasyn and vanco   Will check a MRI of the ankle to r/o septic arthritis

## 2023-11-13 ENCOUNTER — PHARMACY VISIT (OUTPATIENT)
Dept: PHARMACY | Facility: MEDICAL CENTER | Age: 28
End: 2023-11-13
Payer: COMMERCIAL

## 2023-11-13 ENCOUNTER — APPOINTMENT (OUTPATIENT)
Dept: RADIOLOGY | Facility: MEDICAL CENTER | Age: 28
End: 2023-11-13
Attending: INTERNAL MEDICINE
Payer: MEDICAID

## 2023-11-13 VITALS
SYSTOLIC BLOOD PRESSURE: 104 MMHG | HEART RATE: 89 BPM | RESPIRATION RATE: 16 BRPM | BODY MASS INDEX: 25.75 KG/M2 | WEIGHT: 154.54 LBS | OXYGEN SATURATION: 97 % | HEIGHT: 65 IN | TEMPERATURE: 98.2 F | DIASTOLIC BLOOD PRESSURE: 67 MMHG

## 2023-11-13 PROCEDURE — 99239 HOSP IP/OBS DSCHRG MGMT >30: CPT | Performed by: INTERNAL MEDICINE

## 2023-11-13 PROCEDURE — 96375 TX/PRO/DX INJ NEW DRUG ADDON: CPT | Mod: XU

## 2023-11-13 PROCEDURE — 96366 THER/PROPH/DIAG IV INF ADDON: CPT

## 2023-11-13 PROCEDURE — 700111 HCHG RX REV CODE 636 W/ 250 OVERRIDE (IP): Mod: JZ,UD | Performed by: STUDENT IN AN ORGANIZED HEALTH CARE EDUCATION/TRAINING PROGRAM

## 2023-11-13 PROCEDURE — 73723 MRI JOINT LWR EXTR W/O&W/DYE: CPT | Mod: LT

## 2023-11-13 PROCEDURE — A9579 GAD-BASE MR CONTRAST NOS,1ML: HCPCS | Mod: UD | Performed by: INTERNAL MEDICINE

## 2023-11-13 PROCEDURE — 700105 HCHG RX REV CODE 258: Mod: UD | Performed by: STUDENT IN AN ORGANIZED HEALTH CARE EDUCATION/TRAINING PROGRAM

## 2023-11-13 PROCEDURE — RXMED WILLOW AMBULATORY MEDICATION CHARGE: Performed by: INTERNAL MEDICINE

## 2023-11-13 PROCEDURE — 700117 HCHG RX CONTRAST REV CODE 255: Mod: UD | Performed by: INTERNAL MEDICINE

## 2023-11-13 PROCEDURE — G0378 HOSPITAL OBSERVATION PER HR: HCPCS

## 2023-11-13 RX ORDER — AMOXICILLIN AND CLAVULANATE POTASSIUM 875; 125 MG/1; MG/1
1 TABLET, FILM COATED ORAL 2 TIMES DAILY
Qty: 14 TABLET | Refills: 0 | Status: ACTIVE | OUTPATIENT
Start: 2023-11-13 | End: 2023-11-20

## 2023-11-13 RX ADMIN — GADOTERIDOL 15 ML: 279.3 INJECTION, SOLUTION INTRAVENOUS at 05:15

## 2023-11-13 RX ADMIN — AMPICILLIN AND SULBACTAM 3 G: 1; 2 INJECTION, POWDER, FOR SOLUTION INTRAMUSCULAR; INTRAVENOUS at 00:30

## 2023-11-13 RX ADMIN — VANCOMYCIN HYDROCHLORIDE 750 MG: 5 INJECTION, POWDER, LYOPHILIZED, FOR SOLUTION INTRAVENOUS at 01:21

## 2023-11-13 RX ADMIN — AMPICILLIN AND SULBACTAM 3 G: 1; 2 INJECTION, POWDER, FOR SOLUTION INTRAMUSCULAR; INTRAVENOUS at 05:59

## 2023-11-13 ASSESSMENT — FIBROSIS 4 INDEX: FIB4 SCORE: .6071428571428571429

## 2023-11-13 ASSESSMENT — PAIN DESCRIPTION - PAIN TYPE: TYPE: ACUTE PAIN

## 2023-11-13 NOTE — DISCHARGE PLANNING
Met with pt who said that she is independent with ADLs and IADLs.  can help her as needed. Pt said she does not have a PCP so Gaurav from scheduling will assist.     Discussed with IDT and MD is planning to discharge today with Augmentin po abx.     Care Transition Team Assessment    Information Source  Orientation Level: Oriented X4  Information Given By: Patient  Who is responsible for making decisions for patient? : Patient    Readmission Evaluation  Is this a readmission?: No    Elopement Risk  Legal Hold: No  Ambulatory or Self Mobile in Wheelchair: Yes  Disoriented: No  Psychiatric Symptoms: None  History of Wandering: No  Elopement this Admit: No  Vocalizing Wanting to Leave: No  Displays Behaviors, Body Language Wanting to Leave: No-Not at Risk for Elopement  Elopement Risk: Not at Risk for Elopement    Interdisciplinary Discharge Planning  Does Admitting Nurse Feel This Could be a Complex Discharge?: No  Primary Care Physician: Does not have a pcp  Lives with - Patient's Self Care Capacity: Spouse  Patient or legal guardian wants to designate a caregiver: No  Support Systems: Spouse / Significant Other  Housing / Facility: [address]  Do You Take your Prescribed Medications Regularly: Yes  Able to Return to Previous ADL's: Yes  Mobility Issues: No  Prior Services: None, Home-Independent  Patient Prefers to be Discharged to:: Home  Assistance Needed: Yes  Durable Medical Equipment: Not Applicable    Discharge Preparedness  What is your plan after discharge?: Home with help  What are your discharge supports?: Spouse  Prior Functional Level: Ambulatory, Drives Self, Independent with Activities of Daily Living, Independent with Medication Management  Difficulity with ADLs: None  Difficulity with IADLs: None    Functional Assesment  Prior Functional Level: Ambulatory, Drives Self, Independent with Activities of Daily Living, Independent with Medication Management    Finances  Financial Barriers to  Discharge: No  Prescription Coverage: Yes    Vision / Hearing Impairment  Vision Impairment : No  Hearing Impairment : No    Values / Beliefs / Concerns  Values / Beliefs Concerns : No    Advance Directive  Advance Directive?: None    Domestic Abuse  Have you ever been the victim of abuse or violence?: No  Physical Abuse or Sexual Abuse: No  Verbal Abuse or Emotional Abuse: No  Possible Abuse/Neglect Reported to:: Not Applicable         Discharge Risks or Barriers  Discharge risks or barriers?: No PCP (CM notified  Suzy to help find PCP)  Patient risk factors: No PCP    Anticipated Discharge Information  Discharge Disposition: Discharged to home/self care (01)

## 2023-11-13 NOTE — CARE PLAN
The patient is Stable - Low risk of patient condition declining or worsening    Shift Goals  Clinical Goals: Hemodynamically stable  Patient Goals: to go home  Family Goals: KORY    Progress made toward(s) clinical / shift goals:       Problem: Hemodynamics  Goal: Patient's hemodynamics, fluid balance and neurologic status will be stable or improve  Outcome: Progressing     Problem: Respiratory  Goal: Patient will achieve/maintain optimum respiratory ventilation and gas exchange  Outcome: Progressing       Patient is not progressing towards the following goals:    Problem: Knowledge Deficit - Standard  Goal: Patient and family/care givers will demonstrate understanding of plan of care, disease process/condition, diagnostic tests and medications  Outcome: Not Progressing

## 2023-11-13 NOTE — PROGRESS NOTES
Patient off tele with order for MRI, patient reports all jewelry removed. Patient in stable condition, taken by transporter

## 2023-11-13 NOTE — CARE PLAN
The patient is Stable - Low risk of patient condition declining or worsening    Shift Goals  Clinical Goals: administer antibiotics, monitor VS + labs  Patient Goals: to go home  Family Goals: KORY    Progress made toward(s) clinical / shift goals:        Problem: Knowledge Deficit - Standard  Goal: Patient and family/care givers will demonstrate understanding of plan of care, disease process/condition, diagnostic tests and medications  Outcome: Progressing     Problem: Hemodynamics  Goal: Patient's hemodynamics, fluid balance and neurologic status will be stable or improve  Outcome: Progressing       Patient is not progressing towards the following goals:

## 2023-11-13 NOTE — DISCHARGE INSTRUCTIONS
Cellulitis, Adult    Cellulitis is a skin infection. The infected area is often warm, red, swollen, and sore. It occurs most often in the arms and lower legs. It is very important to get treated for this condition.  What are the causes?  This condition is caused by bacteria. The bacteria enter through a break in the skin, such as a cut, burn, insect bite, open sore, or crack.  What increases the risk?  This condition is more likely to occur in people who:  Have a weak body defense system (immune system).  Have open cuts, burns, bites, or scrapes on the skin.  Are older than 60 years of age.  Have a blood sugar problem (diabetes).  Have a long-lasting (chronic) liver disease (cirrhosis) or kidney disease.  Are very overweight (obese).  Have a skin problem, such as:  Itchy rash (eczema).  Slow movement of blood in the veins (venous stasis).  Fluid buildup below the skin (edema).  Have been treated with high-energy rays (radiation).  Use IV drugs.  What are the signs or symptoms?  Symptoms of this condition include:  Skin that is:  Red.  Streaking.  Spotting.  Swollen.  Sore or painful when you touch it.  Warm.  A fever.  Chills.  Blisters.  How is this diagnosed?  This condition is diagnosed based on:  Medical history.  Physical exam.  Blood tests.  Imaging tests.  How is this treated?  Treatment for this condition may include:  Medicines to treat infections or allergies.  Home care, such as:  Rest.  Placing cold or warm cloths (compresses) on the skin.  Hospital care, if the condition is very bad.  Follow these instructions at home:  Medicines  Take over-the-counter and prescription medicines only as told by your doctor.  If you were prescribed an antibiotic medicine, take it as told by your doctor. Do not stop taking it even if you start to feel better.  General instructions    Drink enough fluid to keep your pee (urine) pale yellow.  Do not touch or rub the infected area.  Raise (elevate) the infected area above  the level of your heart while you are sitting or lying down.  Place cold or warm cloths on the area as told by your doctor.  Keep all follow-up visits as told by your doctor. This is important.  Contact a doctor if:  You have a fever.  You do not start to get better after 1-2 days of treatment.  Your bone or joint under the infected area starts to hurt after the skin has healed.  Your infection comes back. This can happen in the same area or another area.  You have a swollen bump in the area.  You have new symptoms.  You feel ill and have muscle aches and pains.  Get help right away if:  Your symptoms get worse.  You feel very sleepy.  You throw up (vomit) or have watery poop (diarrhea) for a long time.  You see red streaks coming from the area.  Your red area gets larger.  Your red area turns dark in color.  These symptoms may represent a serious problem that is an emergency. Do not wait to see if the symptoms will go away. Get medical help right away. Call your local emergency services (911 in the [place] Do not drive yourself to the hospital.  Summary  Cellulitis is a skin infection. The area is often warm, red, swollen, and sore.  This condition is treated with medicines, rest, and cold and warm cloths.  Take all medicines only as told by your doctor.  Tell your doctor if symptoms do not start to get better after 1-2 days of treatment.  This information is not intended to replace advice given to you by your health care provider. Make sure you discuss any questions you have with your health care provider.  Document Revised: 09/29/2022 Document Reviewed: 09/29/2022  Elsevier Patient Education © 2023 [employer]

## 2023-11-13 NOTE — PROGRESS NOTES
Received bedside report from night RN and assumed care. Patient is AxO4 on room air. Patient instructed to call for ambulation. Call light within reach and fall precautions in place.

## 2023-11-13 NOTE — DISCHARGE SUMMARY
Discharge Summary    CHIEF COMPLAINT ON ADMISSION  Chief Complaint   Patient presents with    Leg Swelling              Reason for Admission  Leg swelling     Admission Date  11/11/2023    CODE STATUS  Full Code    HPI & HOSPITAL COURSE  This is a 28 y.o. female with no significant medical history who presented 11/11/2023 with left lower extremity swelling, redness and tenderness over the last few days.  Patient reports increased pain when walking.  Reports subjective fevers and chills.  No other relieving or exacerbating factors are noted.  In the ER, she did meet criteria for SIRS started on IV fluids and broad-spectrum IV antibiotics for left lower extremity cellulitis.  Given swelling a DVT study was done thankfully negative for venous thrombosis.   Patient  started on IV abx.  A MRI of left ankle was done and did not show any osteomyelitis or septic arthritis.  Patient now doing better. Will discharge patient home today on Po abx for another 7 days     The patient met 2-midnight criteria for an inpatient stay at the time of discharge.    Discharge Date  11/13/23    FOLLOW UP ITEMS POST DISCHARGE  PCP    DISCHARGE DIAGNOSES  Principal Problem:    Cellulitis of left lower extremity (POA: Unknown)  Active Problems:    SIRS (systemic inflammatory response syndrome) (HCC) (POA: Yes)  Resolved Problems:    * No resolved hospital problems. *      FOLLOW UP  Future Appointments   Date Time Provider Department Center   11/20/2023  1:30 PM NELLA Ludwig     No follow-up provider specified.    MEDICATIONS ON DISCHARGE     Medication List        START taking these medications        Instructions   amoxicillin-clavulanate 875-125 MG Tabs  Commonly known as: Augmentin   Take 1 Tablet by mouth 2 times a day for 7 days.  Dose: 1 Tablet              Allergies  Allergies   Allergen Reactions    Nkda [No Known Drug Allergy]        DIET  Orders Placed This Encounter   Procedures    Diet Order Diet: Regular      Standing Status:   Standing     Number of Occurrences:   1     Order Specific Question:   Diet:     Answer:   Regular [1]       ACTIVITY  As tolerated.  Weight bearing as tolerated    CONSULTATIONS  None     PROCEDURES  None     LABORATORY  Lab Results   Component Value Date    SODIUM 141 11/11/2023    POTASSIUM 3.7 11/11/2023    CHLORIDE 107 11/11/2023    CO2 23 11/11/2023    GLUCOSE 75 11/11/2023    BUN 16 11/11/2023    CREATININE 0.80 11/11/2023    CREATININE 0.7 03/26/2009        Lab Results   Component Value Date    WBC 9.6 11/11/2023    HEMOGLOBIN 11.9 (L) 11/11/2023    HEMATOCRIT 38.1 11/11/2023    PLATELETCT 210 11/11/2023        Total time of the discharge process exceeds 35 minutes.

## 2023-11-17 LAB
BACTERIA BLD CULT: NORMAL
BACTERIA BLD CULT: NORMAL
SIGNIFICANT IND 70042: NORMAL
SIGNIFICANT IND 70042: NORMAL
SITE SITE: NORMAL
SITE SITE: NORMAL
SOURCE SOURCE: NORMAL
SOURCE SOURCE: NORMAL

## 2023-11-20 ENCOUNTER — APPOINTMENT (OUTPATIENT)
Dept: INTERNAL MEDICINE | Facility: OTHER | Age: 28
End: 2023-11-20
Payer: MEDICAID

## 2023-11-22 ENCOUNTER — APPOINTMENT (OUTPATIENT)
Dept: INTERNAL MEDICINE | Facility: OTHER | Age: 28
End: 2023-11-22
Payer: MEDICAID

## 2023-11-28 ENCOUNTER — APPOINTMENT (OUTPATIENT)
Dept: INTERNAL MEDICINE | Facility: OTHER | Age: 28
End: 2023-11-28
Payer: MEDICAID

## 2023-11-30 ENCOUNTER — APPOINTMENT (OUTPATIENT)
Dept: INTERNAL MEDICINE | Facility: OTHER | Age: 28
End: 2023-11-30
Payer: MEDICAID

## 2023-12-06 ENCOUNTER — OFFICE VISIT (OUTPATIENT)
Dept: INTERNAL MEDICINE | Facility: OTHER | Age: 28
End: 2023-12-06
Payer: MEDICAID

## 2023-12-06 VITALS
TEMPERATURE: 98.4 F | OXYGEN SATURATION: 99 % | WEIGHT: 146.6 LBS | DIASTOLIC BLOOD PRESSURE: 79 MMHG | BODY MASS INDEX: 24.43 KG/M2 | HEART RATE: 105 BPM | SYSTOLIC BLOOD PRESSURE: 114 MMHG | HEIGHT: 65 IN

## 2023-12-06 DIAGNOSIS — R00.0 TACHYCARDIA: ICD-10-CM

## 2023-12-06 DIAGNOSIS — R60.0 LOCALIZED EDEMA: ICD-10-CM

## 2023-12-06 DIAGNOSIS — D64.9 ANEMIA, UNSPECIFIED TYPE: ICD-10-CM

## 2023-12-06 DIAGNOSIS — Z00.00 ROUTINE ADULT HEALTH MAINTENANCE: ICD-10-CM

## 2023-12-06 DIAGNOSIS — R60.0 LOWER EXTREMITY EDEMA: ICD-10-CM

## 2023-12-06 DIAGNOSIS — F32.A DEPRESSION, UNSPECIFIED DEPRESSION TYPE: ICD-10-CM

## 2023-12-06 DIAGNOSIS — F15.10 METHAMPHETAMINE ABUSE (HCC): ICD-10-CM

## 2023-12-06 PROCEDURE — 99204 OFFICE O/P NEW MOD 45 MIN: CPT | Mod: GC

## 2023-12-06 PROCEDURE — 3074F SYST BP LT 130 MM HG: CPT

## 2023-12-06 PROCEDURE — 3078F DIAST BP <80 MM HG: CPT

## 2023-12-06 ASSESSMENT — PATIENT HEALTH QUESTIONNAIRE - PHQ9
SUM OF ALL RESPONSES TO PHQ QUESTIONS 1-9: 11
CLINICAL INTERPRETATION OF PHQ2 SCORE: 3
5. POOR APPETITE OR OVEREATING: 0 - NOT AT ALL

## 2023-12-06 ASSESSMENT — FIBROSIS 4 INDEX: FIB4 SCORE: .6071428571428571429

## 2023-12-06 NOTE — PROGRESS NOTES
New Patient    Emmy Henderson is a 28 y.o. female who presents today with the following:    CC: Establish Care with Primary Care Physician     HPI:  28-year-old female with a past medical history of HSV diagnosed during pregnancy as part of routine screening never had any outbreaks, meth use with recent relapse but currently in early remission x 2 months, presents today with a chief complaint of lower extremity edema for which she was hospitalized and treated for suspected cellulitis as she met SIRS criteria at time of admission.  DVT was ruled out MRI obtained additionally ruled out osteomyelitis.  Additionally patient reports having low mood for which a PHQ-9 was performed.  She is a mother of 4 who has been  from her  for several years however they live together and co-parent.     PHQ-9 = 11  10-14 Moderate Depression       Past Medical History:   Diagnosis Date    HSV (herpes simplex virus) anogenital infection 05/19/2014    SIRS (systemic inflammatory response syndrome) (HCC) 11/12/2023    Substance abuse (HCC)     last used meth 2014       Past Surgical History:   Procedure Laterality Date    REPEAT C SECTION W TUBAL LIGATION  10/20/2016    Procedure: REPEAT C SECTION WITH TUBAL LIGATION;  Surgeon: Marylin Crowley M.D.;  Location: [facility] DEL[facility];  Service:     REPEAT C SECTION  8/14/2014    Performed by Marylin Hawkins M.D. at [facility] DEL[facility]ERY    REPEAT C SECTION  6/4/2011    Performed by FLORENCIO CABALLERO at [facility] AND DELIVERY    PRIMARY C SECTION  2009    OTHER ABDOMINAL SURGERY         Family History   Problem Relation Age of Onset    Diabetes Mother         NIDDM    Cancer Maternal Grandfather        Social History     Socioeconomic History    Marital status:      Spouse name: Not on file    Number of children: Not on file    Years of education: Not on file    Highest education level: Not on file   Occupational History    Not on file  "  Tobacco Use    Smoking status: Every Day     Current packs/day: 0.50     Average packs/day: 0.5 packs/day for 1 year (0.5 ttl pk-yrs)     Types: Cigarettes    Smokeless tobacco: Current    Tobacco comments:     Quit smoking 3 months ago.   Substance and Sexual Activity    Alcohol use: No    Drug use: Not Currently     Types: Methamphetamines, Inhaled     Comment: THC    Sexual activity: Not Currently     Partners: Male     Comment: Unplanned pregancy   Other Topics Concern    Not on file   Social History Narrative    ** Merged History Encounter **          Social Determinants of Health     Financial Resource Strain: Not on file   Food Insecurity: Not on file   Transportation Needs: Not on file   Physical Activity: Not on file   Stress: Not on file   Social Connections: Not on file   Intimate Partner Violence: Not on file   Housing Stability: Not on file       Current Outpatient Medications   Medication Sig Dispense Refill    escitalopram (LEXAPRO) 10 MG Tab Take 1 Tablet by mouth every day. 30 Tablet 1     No current facility-administered medications for this visit.       Allergies   Allergen Reactions    Nkda [No Known Drug Allergy]        ROS:   As per HPI. Additional pertinent systems as noted below.  ROS    Physical Exam:  /79 (BP Location: Right arm, Patient Position: Sitting, BP Cuff Size: Adult)   Pulse (!) 105   Temp 36.9 °C (98.4 °F) (Temporal)   Ht 1.651 m (5' 5\")   Wt 66.5 kg (146 lb 9.6 oz)   SpO2 99%   BMI 24.40 kg/m²     Physical Exam  Constitutional:       General: She is not in acute distress.     Appearance: She is normal weight. She is not ill-appearing, toxic-appearing or diaphoretic.   HENT:      Mouth/Throat:      Mouth: Mucous membranes are moist.   Eyes:      Conjunctiva/sclera: Conjunctivae normal.   Cardiovascular:      Rate and Rhythm: Regular rhythm. Tachycardia present.   Pulmonary:      Effort: Pulmonary effort is normal.      Breath sounds: Normal breath sounds. "   Musculoskeletal:         General: Swelling present.      Right lower leg: Edema present.      Left lower leg: Edema present.      Comments: 1-2+ LLE to the mid shin  Trace-1+ RLE to midshin    Skin:     General: Skin is warm and dry.   Neurological:      General: No focal deficit present.      Mental Status: She is alert and oriented to person, place, and time.   Psychiatric:         Behavior: Behavior normal.         Judgment: Judgment normal.      Comments: Flat affect, depressed mood          Assessment and Plan:     28 y.o. female with:   Methamphetamine abuse- clean x 3 years  Lower extremity edema  Tachycardia  Given history of methamphetamine use, suspect a component of cardiomyopathy.   - EC-ECHOCARDIOGRAM COMPLETE W/O CONT; Future  - proBrain Natriuretic Peptide, NT; Future  - Comp Metabolic Panel; Future    Anemia, unspecified type  - EC-ECHOCARDIOGRAM COMPLETE W/O CONT; Future  - CBC WITH DIFFERENTIAL; Future  - FERRITIN; Future  - IRON/TOTAL IRON BIND; Future    Depression, unspecified depression type  PHQ9 Score = 11, moderate depression   - escitalopram (LEXAPRO) 10 MG Tab; Take 1 Tablet by mouth every day.   Dispense: 30 Tablet; Refill: 1  - Referral to Psychology    Need for seasonal flu vaccine: declined     #Healthcare Maintenance    Healthcare maintenance: will defer to next office visit     Follow up in 4-6 weeks    Keira Harmon, PGY-1  Internal Medicine  [facility] [[email or web address]] of [facility]

## 2023-12-07 PROBLEM — R65.10 SIRS (SYSTEMIC INFLAMMATORY RESPONSE SYNDROME) (HCC): Status: RESOLVED | Noted: 2023-11-12 | Resolved: 2023-12-07

## 2023-12-07 PROBLEM — R00.0 TACHYCARDIA: Status: ACTIVE | Noted: 2023-12-07

## 2023-12-07 PROBLEM — L03.116 CELLULITIS OF LEFT LOWER EXTREMITY: Status: RESOLVED | Noted: 2023-11-12 | Resolved: 2023-12-07

## 2023-12-07 PROBLEM — R60.0 LOCALIZED EDEMA: Status: ACTIVE | Noted: 2023-12-07

## 2023-12-07 RX ORDER — ESCITALOPRAM OXALATE 10 MG/1
10 TABLET ORAL DAILY
Qty: 30 TABLET | Refills: 1 | Status: SHIPPED | OUTPATIENT
Start: 2023-12-07 | End: 2024-02-28

## 2024-01-08 ENCOUNTER — APPOINTMENT (OUTPATIENT)
Dept: CARDIOLOGY | Facility: MEDICAL CENTER | Age: 29
End: 2024-01-08
Payer: MEDICAID

## 2024-01-10 ENCOUNTER — APPOINTMENT (OUTPATIENT)
Dept: CARDIOLOGY | Facility: MEDICAL CENTER | Age: 29
End: 2024-01-10
Payer: MEDICAID

## 2024-01-15 ENCOUNTER — ANCILLARY PROCEDURE (OUTPATIENT)
Dept: CARDIOLOGY | Facility: MEDICAL CENTER | Age: 29
End: 2024-01-15
Payer: MEDICAID

## 2024-01-15 DIAGNOSIS — R00.0 TACHYCARDIA: ICD-10-CM

## 2024-01-15 DIAGNOSIS — D64.9 ANEMIA, UNSPECIFIED TYPE: ICD-10-CM

## 2024-01-15 DIAGNOSIS — R60.0 LOWER EXTREMITY EDEMA: ICD-10-CM

## 2024-01-15 DIAGNOSIS — F15.10 METHAMPHETAMINE ABUSE (HCC): ICD-10-CM

## 2024-01-15 LAB
LV EJECT FRACT  99904: 65
LV EJECT FRACT MOD 2C 99903: 62.52
LV EJECT FRACT MOD 4C 99902: 65
LV EJECT FRACT MOD BP 99901: 62.65

## 2024-01-15 PROCEDURE — 93306 TTE W/DOPPLER COMPLETE: CPT

## 2024-01-15 PROCEDURE — 93306 TTE W/DOPPLER COMPLETE: CPT | Mod: 26 | Performed by: INTERNAL MEDICINE

## 2024-02-26 ENCOUNTER — APPOINTMENT (OUTPATIENT)
Dept: INTERNAL MEDICINE | Facility: OTHER | Age: 29
End: 2024-02-26
Payer: MEDICAID

## 2024-02-28 ENCOUNTER — OFFICE VISIT (OUTPATIENT)
Dept: INTERNAL MEDICINE | Facility: OTHER | Age: 29
End: 2024-02-28
Payer: MEDICAID

## 2024-02-28 VITALS — DIASTOLIC BLOOD PRESSURE: 80 MMHG | TEMPERATURE: 98.8 F | SYSTOLIC BLOOD PRESSURE: 124 MMHG | HEART RATE: 103 BPM

## 2024-02-28 DIAGNOSIS — R60.0 LOWER EXTREMITY EDEMA: ICD-10-CM

## 2024-02-28 DIAGNOSIS — L53.9 ERYTHEMA OF LOWER EXTREMITY: ICD-10-CM

## 2024-02-28 DIAGNOSIS — D64.9 ANEMIA, UNSPECIFIED TYPE: ICD-10-CM

## 2024-02-28 PROCEDURE — 99214 OFFICE O/P EST MOD 30 MIN: CPT | Mod: GC

## 2024-02-28 PROCEDURE — 3074F SYST BP LT 130 MM HG: CPT

## 2024-02-28 PROCEDURE — 3079F DIAST BP 80-89 MM HG: CPT

## 2024-02-28 RX ORDER — TRIAMCINOLONE ACETONIDE 1 MG/G
1 CREAM TOPICAL 2 TIMES DAILY
Qty: 28.4 G | Refills: 1 | Status: SHIPPED | OUTPATIENT
Start: 2024-02-28

## 2024-02-28 RX ORDER — CETIRIZINE HYDROCHLORIDE 10 MG/1
10 TABLET ORAL DAILY
Qty: 90 TABLET | Refills: 1 | Status: SHIPPED | OUTPATIENT
Start: 2024-02-28

## 2024-02-28 RX ORDER — CETIRIZINE HYDROCHLORIDE 10 MG/1
10 TABLET ORAL DAILY
Qty: 30 TABLET | Refills: 1 | Status: SHIPPED | OUTPATIENT
Start: 2024-02-28 | End: 2024-02-28 | Stop reason: SDUPTHER

## 2024-02-28 ASSESSMENT — ENCOUNTER SYMPTOMS
ORTHOPNEA: 1
PALPITATIONS: 1

## 2024-02-28 ASSESSMENT — PATIENT HEALTH QUESTIONNAIRE - PHQ9
CLINICAL INTERPRETATION OF PHQ2 SCORE: 1
5. POOR APPETITE OR OVEREATING: 0 - NOT AT ALL

## 2024-02-28 NOTE — PROGRESS NOTES
Follow Up    Emmy Henderson is a 28 y.o. female who presents today with the following:    CC: lower extremity erythema    HPI:  28-year-old female with a past medical history of HSV diagnosed during pregnancy as part of routine screening never had any outbreaks, history of meth use in remission, months, presents today with a chief complaint of lower extremity erythema, previously seen for lower extremity erythema and edema .  Edema has since resolved and echo was obtained ruling out cardiac origin.  Patient states that erythema is limited to the ankles,, she experiences intense pruritus that is worse at night and after taking hot showers.  Additionally endorses shortness of breath and palpitations.  No fever, chills, overt signs of bleeding. DVTs ruled out on previous admission.     Has not obtained labs ordered at [facility] but did get echo which was normal   Also discussed mood symptoms.  Was prescribed Lexapro at last office visit however patient never started this.  She did start CBT with her daughter stating it is helping.  Does not wish to start lexapro.     Past Medical History:   Diagnosis Date    HSV (herpes simplex virus) anogenital infection 05/19/2014    SIRS (systemic inflammatory response syndrome) (HCC) 11/12/2023    Substance abuse (HCC)     last used meth 2014       Past Surgical History:   Procedure Laterality Date    REPEAT C SECTION W TUBAL LIGATION  10/20/2016    Procedure: REPEAT C SECTION WITH TUBAL LIGATION;  Surgeon: Marylin Crowley M.D.;  Location: [facility]IV[facility];  Service:     REPEAT C SECTION  8/14/2014    Performed by Marylin Hawkins M.D. at [facility]    REPEAT C SECTION  6/4/2011    Performed by FLORENCIO CABALLERO at [facility] DELIV[facility]    PRIMARY C SECTION  2009    OTHER ABDOMINAL SURGERY         Family History   Problem Relation Age of Onset    Diabetes Mother         NIDDM    Cancer Maternal Grandfather        Social History     Socioeconomic  History    Marital status:      Spouse name: Not on file    Number of children: Not on file    Years of education: Not on file    Highest education level: Not on file   Occupational History    Not on file   Tobacco Use    Smoking status: Every Day     Current packs/day: 0.50     Average packs/day: 0.5 packs/day for 1 year (0.5 ttl pk-yrs)     Types: Cigarettes    Smokeless tobacco: Current    Tobacco comments:     Quit smoking 3 months ago.   Substance and Sexual Activity    Alcohol use: No    Drug use: Not Currently     Types: Methamphetamines, Inhaled     Comment: THC    Sexual activity: Not Currently     Partners: Male     Comment: Unplanned pregancy   Other Topics Concern    Not on file   Social History Narrative    ** Merged History Encounter **          Social Determinants of Health     Financial Resource Strain: Not on file   Food Insecurity: Not on file   Transportation Needs: Not on file   Physical Activity: Not on file   Stress: Not on file   Social Connections: Not on file   Intimate Partner Violence: Not on file   Housing Stability: Not on file       Current Outpatient Medications   Medication Sig Dispense Refill    escitalopram (LEXAPRO) 10 MG Tab Take 1 Tablet by mouth every day. 30 Tablet 1     No current facility-administered medications for this visit.       Allergies   Allergen Reactions    Nkda [No Known Drug Allergy]        ROS:   As per HPI. Additional pertinent systems as noted below.  Review of Systems   Cardiovascular:  Positive for palpitations, orthopnea and leg swelling.   Skin:  Positive for itching and rash.       Physical Exam:  There were no vitals taken for this visit.    Physical Exam  Constitutional:       General: She is not in acute distress.     Appearance: She is normal weight. She is not ill-appearing, toxic-appearing or diaphoretic.   HENT:      Mouth/Throat:      Mouth: Mucous membranes are moist.   Eyes:      Conjunctiva/sclera: Conjunctivae normal.   Cardiovascular:       Rate and Rhythm: Regular rhythm. Tachycardia present.   Pulmonary:      Effort: Pulmonary effort is normal.      Breath sounds: Normal breath sounds.   Musculoskeletal:         General: Swelling present.      Right lower leg: Edema present.      Left lower leg: Edema present.      Comments: No lower extremity edema  Blanchable erythema at bilateral ankles circumferentially limited to 3 to 4 inch with sparing shins and feet  Mildly dry skin   Skin:     General: Skin is warm and dry.   Neurological:      General: No focal deficit present.      Mental Status: She is alert and oriented to person, place, and time.   Psychiatric:         Behavior: Behavior normal.         Judgment: Judgment normal.      Comments: Flat affect, depressed mood          Assessment and Plan:     28 y.o. female with:     Lower extremity erythema  Tachycardia  Anemia, unspecified type  History of methamphetamine use with tachycardia. Lower extremity edema has resolved.  12/2023 Echo was unremarkable, less likely cardiomyopathy. Possibly related to anemia. Patient did not obtain labs ordered at last office visit. Encouraged patient to get labs at earliest convenience for further evaluation   Additionally may represent small vessel vasculitis given blanching.  Will obtain CBC to evaluate platelets.  Started on cetirizine and topical triamcinolone cream.  Screen for autoimmune conditions with [facility].  Referred to dermatology for further evaluation.  - proBrain Natriuretic Peptide, NT; Future  - Comp Metabolic Panel; Future  - CBC WITH DIFFERENTIAL; Future  - FERRITIN; Future  - IRON/TOTAL IRON BIND; Future  - LUKE W/REFLEX IF POSITIVE  - Sed Rate; Future  - CRP QUANTITIVE (NON-CARDIAC); Future  - HIV AG/AB COMBO ASSAY SCREENING; Future  - HEP B SURFACE ANTIGEN; Future  - RPR (SYPHILIS); Future  - ANTICARDIOLIPIN AB IGG,IGM,IGA; Future  - triamcinolone acetonide (KENALOG) 0.1 % Cream; Apply 1 Application topically 2 times a day.  Dispense: 28.4 g;  Refill: 1  - Referral to Dermatology  - cetirizine (ZYRTEC) 10 MG Tab; Take 1 Tablet by mouth every day.  Dispense: 30 Tablet; Refill: 1       Depression, unspecified depression type  -Continue with CBT        #Healthcare Maintenance  Healthcare maintenance: will defer to future office visit   Need for seasonal flu vaccine: declined   Declined other vaccinations  Ordered infectious disease screenings    Follow up in 4-6 weeks    Keira Harmon, PGY-1  Internal Medicine  [facility] [employer]

## 2024-04-08 ENCOUNTER — APPOINTMENT (OUTPATIENT)
Dept: INTERNAL MEDICINE | Facility: OTHER | Age: 29
End: 2024-04-08
Payer: MEDICAID

## 2024-07-01 ENCOUNTER — APPOINTMENT (OUTPATIENT)
Dept: INTERNAL MEDICINE | Facility: OTHER | Age: 29
End: 2024-07-01
Payer: MEDICAID

## 2024-07-10 ENCOUNTER — APPOINTMENT (OUTPATIENT)
Dept: INTERNAL MEDICINE | Facility: OTHER | Age: 29
End: 2024-07-10
Payer: MEDICAID

## 2024-08-14 ENCOUNTER — APPOINTMENT (OUTPATIENT)
Dept: INTERNAL MEDICINE | Facility: OTHER | Age: 29
End: 2024-08-14
Payer: MEDICAID

## 2025-06-16 ENCOUNTER — HOSPITAL ENCOUNTER (EMERGENCY)
Facility: MEDICAL CENTER | Age: 30
End: 2025-06-17
Attending: EMERGENCY MEDICINE
Payer: MEDICAID

## 2025-06-16 VITALS
OXYGEN SATURATION: 98 % | TEMPERATURE: 98.5 F | BODY MASS INDEX: 26.82 KG/M2 | WEIGHT: 161 LBS | HEIGHT: 65 IN | HEART RATE: 81 BPM | SYSTOLIC BLOOD PRESSURE: 115 MMHG | RESPIRATION RATE: 16 BRPM | DIASTOLIC BLOOD PRESSURE: 80 MMHG

## 2025-06-16 DIAGNOSIS — R45.851 SUICIDAL IDEATION: Primary | ICD-10-CM

## 2025-06-16 DIAGNOSIS — F15.10 METHAMPHETAMINE ABUSE (HCC): ICD-10-CM

## 2025-06-16 LAB
AMPHET UR QL SCN: POSITIVE
BARBITURATES UR QL SCN: NEGATIVE
BENZODIAZ UR QL SCN: NEGATIVE
BZE UR QL SCN: NEGATIVE
CANNABINOIDS UR QL SCN: NEGATIVE
FENTANYL UR QL: NEGATIVE
HCG UR QL: NEGATIVE
METHADONE UR QL SCN: NEGATIVE
OPIATES UR QL SCN: NEGATIVE
OXYCODONE UR QL SCN: NEGATIVE
PCP UR QL SCN: NEGATIVE
POC BREATHALIZER: 0.13 PERCENT (ref 0–0.01)
PROPOXYPH UR QL SCN: NEGATIVE

## 2025-06-16 PROCEDURE — 81025 URINE PREGNANCY TEST: CPT

## 2025-06-16 PROCEDURE — 99285 EMERGENCY DEPT VISIT HI MDM: CPT

## 2025-06-16 PROCEDURE — 302970 POC BREATHALIZER: Performed by: EMERGENCY MEDICINE

## 2025-06-16 PROCEDURE — 80307 DRUG TEST PRSMV CHEM ANLYZR: CPT

## 2025-06-16 ASSESSMENT — PAIN DESCRIPTION - PAIN TYPE: TYPE: ACUTE PAIN

## 2025-06-16 ASSESSMENT — FIBROSIS 4 INDEX: FIB4 SCORE: 0.65

## 2025-06-16 NOTE — ED NOTES
Patient remains comfortable on gurney, no identifiable needs at this time. Equal chest rise and fall bilaterally, pt connected to monitor. Safety measures in place, including 1-1 sitter. Placed on automatic BP and continuous pulse ox. Patient drowsy and requires verbal stimulation to respond.

## 2025-06-16 NOTE — ED NOTES
Patient remains comfortable on gurney, no identifiable needs at this time. Equal chest rise and fall bilaterally, pt connected to monitor. Safety measures in place, including 1-1 sitter. Patient drowsy and requires verbal stimulation to respond.

## 2025-06-16 NOTE — ED TRIAGE NOTES
Chief Complaint   Patient presents with    Suicidal Ideation     BIB by Flavia Lin, patient reportedly attempted to jump out of vehicle multiple times on freeway. History of SI, intent, and planning.

## 2025-06-16 NOTE — ED PROVIDER NOTES
ED Provider Note    CHIEF COMPLAINT  Chief Complaint   Patient presents with    Suicidal Ideation     BIB by Flavia Lin, patient reportedly attempted to jump out of vehicle multiple times on freeway. History of SI, intent, and planning.        HPI/ROS  Emmy Henderson is a 30 y.o. female who presents via the law show  after the patient attempted to jump out of vehicle multiple times.  The patient initially was difficult to interview as she would not respond.  Subsequent was able to get the patient to talk.  She states she is addicted to methamphetamines.  She states has not slept.  She is tired.  She states she is suicidal due to her addiction.  She does not have any current symptoms of her nausea.  She denies alcohol abuse.    PAST MEDICAL HISTORY   has a past medical history of HSV (herpes simplex virus) anogenital infection (05/19/2014), SIRS (systemic inflammatory response syndrome) (HCC) (11/12/2023), and Substance abuse ([facility]).    SURGICAL HISTORY   has a past surgical history that includes other abdominal surgery; primary c section (2009); repeat c section (6/4/2011); repeat c section (8/14/2014); and repeat c section w tubal ligation (10/20/2016).    FAMILY HISTORY  Family History   Problem Relation Age of Onset    Diabetes Mother         NIDDM    Cancer Maternal Grandfather        SOCIAL HISTORY  Social History     Tobacco Use    Smoking status: Every Day     Current packs/day: 0.50     Average packs/day: 0.5 packs/day for 1 year (0.5 ttl pk-yrs)     Types: Cigarettes    Smokeless tobacco: Current    Tobacco comments:     Quit smoking 3 months ago.   Substance and Sexual Activity    Alcohol use: No    Drug use: Not Currently     Types: Methamphetamines, Inhaled     Comment: THC    Sexual activity: Not Currently     Partners: Male     Comment: Unplanned pregancy       CURRENT MEDICATIONS  Home Medications    **Home medications have not yet been reviewed for this encounter**    "      ALLERGIES  Allergies[1]    PHYSICAL EXAM  VITAL SIGNS: /78   Pulse (!) 113   Temp 36.9 °C (98.5 °F)   Resp (!) 22   Ht 1.651 m (5' 5\")   Wt 73 kg (161 lb)   LMP 06/13/2025 (Approximate)   SpO2 98%   BMI 26.79 kg/m²    General The patient appears sedated    HEENT unremarkable    Pulmonary the patient's lungs are clear auscultation bilaterally    Cardiovascular S1-S2 with a tachycardic rate    GI the patient's abdomen is soft    Skin no rashes, pallor, no jaundice    Extremities no distal edema    Neurologic examination is grossly intact    Psychiatric evaluation the patient does have a depressed affect with ongoing suicidal ideation    COURSE & MEDICAL DECISION MAKING    This is a 30-year-old female who presents to the Emergency Department with suicidal ideation.  She would not contract for safety at this time.  Therefore the patient will be placed on a legal hold.  This was already written up by the 's department and will be certified by myself as well as life skills.  The patiently placed under ED observation status as it will take a while for her medical clearance for her to clear the amphetamines as well as for acceptance at a psychiatric care facility.    ED OBS: Yes; I am placing the patient in to an observation status due to a diagnostic uncertainty as well as therapeutic intensity. Patient placed in observation status at 3:22 PM, 6/16/2025.       FINAL DIAGNOSIS  1.  Methamphetamine use  2.  Suicidal ideation     Electronically signed by: Abdirizak Arango M.D., 6/16/2025 3:21 PM           [1]   Allergies  Allergen Reactions    Nkda [No Known Drug Allergy]      "

## 2025-06-17 PROCEDURE — 99285 EMERGENCY DEPT VISIT HI MDM: CPT

## 2025-06-17 NOTE — DISCHARGE PLANNING
Alert Team:    [facility] accepting patient, admitting provider-Dr. Young, for a requested transfer time of 2240. PCS form faxed to [facility]; created transfer packet with original legal hold, COBRA form completed and placed in patient's chart. Informed bedside RN, Charge and ERP.

## 2025-06-17 NOTE — ED NOTES
Sitter remains in LOS for pt 1:1 continued obs, legal hold in place for SI. Pt resting in bed, breathing even and unlabored

## 2025-06-17 NOTE — ED NOTES
Patient remains comfortable on gurney, no identifiable needs at this time. Equal chest rise and fall bilaterally, pt connected to cardiac monitor. Safety measures in place, patient in direct view of 1-1 sitter.

## 2025-06-17 NOTE — ED NOTES
Patient remains comfortable on gurney, no identifiable needs at this time. Equal chest rise and fall bilaterally, pt connected to monitor. Safety measures in place, patient in direct view of 1-1 sitter.

## 2025-06-17 NOTE — DISCHARGE PLANNING
Alert Team:    Attempted to complete consult, patient is too somnolent to complete assessment at this time.     Referral has been faxed to [facility] to send to local inpatient mental health facilities contracted with patient's insurance.

## 2025-06-17 NOTE — ED NOTES
Pt resting on gurney with even and unlabored respirations.   1:1 safety sitter within direct view of pt. Stop sign and room safety precautions in place.     Pt to transfer to [facility] @ [id number].

## 2025-06-17 NOTE — ED NOTES
Pt resting on gurney with even and unlabored respirations.   1:1 safety sitter within direct view of pt. Stop sign and room safety precautions in place.

## 2025-06-17 NOTE — ED NOTES
Report received from Kevin TAYLOR. Pt sleeping on gurfrancia in nad, 1:1 sitter within direct view of pt.  Pt on necessary monitoring equipment due to orientation level.

## 2025-06-17 NOTE — ED NOTES
MEENU arrived for pt transfer to [facility], All pt belongings accounted for and legal hold paperwork with MEENU. Pt is A&Ox 4 on RA ambulated to ambulance bay with MEENU

## 2025-06-17 NOTE — ED NOTES
Bedside report received from CLAUDIA Diaz- assumed care of patient.  POC discussed with patient. Pending transfer to [facility], report given to [facility] by previous RN. Sitter in LOS for SI Legal hold.     Fall risk interventions in place: Move the patient closer to the nurse's station, Place socks on patient, Keep floor surfaces clean and dry, Accompanied to restroom, and Human-sitter if tele-sitter fails (all applicable per [facility] Fall risk assessment)   Continuous monitoring: Cardiac Leads or Pulse Ox  IVF/IV medications: Not Applicable   Oxygen: Room Air  Bedside sitter: Pt on L2k SI with 1:1 sitter . (name)  Isolation: Not Applicable

## 2025-06-17 NOTE — ED NOTES
Patient remains comfortable on gurney, no identifiable needs at this time. Equal chest rise and fall bilaterally, pt connected to monitor. Safety measures in place including patient in 1-1 view of sitter. Patient continues to be drowsy. Sternal rub needed for breathalyzer.

## 2025-06-17 NOTE — CONSULTS
Alert Team:     Legal hold certified by ERP. Patient continues to be too somnolent to complete assessment prior to scheduled transfer to [facility] on L2K for suicidal ideations at 22:40.